# Patient Record
Sex: FEMALE | URBAN - METROPOLITAN AREA
[De-identification: names, ages, dates, MRNs, and addresses within clinical notes are randomized per-mention and may not be internally consistent; named-entity substitution may affect disease eponyms.]

---

## 2024-07-03 ENCOUNTER — DOCUMENTATION ONLY (OUTPATIENT)
Dept: SURGERY | Facility: CLINIC | Age: 59
End: 2024-07-03
Payer: COMMERCIAL

## 2024-07-03 NOTE — PROGRESS NOTES
Received external referral for patient to see Dr. Plata.  Explained Dr. Plata is not accepting new referrals at this time.  Scheduled patient for 7/16 with Dr. Anderson.  Patient verbalized understanding of appointment date, time and location.

## 2024-07-16 ENCOUNTER — OFFICE VISIT (OUTPATIENT)
Dept: HEMATOLOGY/ONCOLOGY | Facility: CLINIC | Age: 59
End: 2024-07-16
Payer: COMMERCIAL

## 2024-07-16 VITALS
TEMPERATURE: 99 F | WEIGHT: 202.63 LBS | HEART RATE: 81 BPM | OXYGEN SATURATION: 97 % | BODY MASS INDEX: 32.56 KG/M2 | DIASTOLIC BLOOD PRESSURE: 72 MMHG | SYSTOLIC BLOOD PRESSURE: 132 MMHG | HEIGHT: 66 IN

## 2024-07-16 DIAGNOSIS — C78.02 SECONDARY MALIGNANT NEOPLASM OF HILUS OF LEFT LUNG: ICD-10-CM

## 2024-07-16 DIAGNOSIS — C50.412 MALIGNANT NEOPLASM OF UPPER-OUTER QUADRANT OF LEFT BREAST IN FEMALE, ESTROGEN RECEPTOR NEGATIVE: Primary | ICD-10-CM

## 2024-07-16 DIAGNOSIS — C77.1 SECONDARY MALIGNANT NEOPLASM OF MEDIASTINAL LYMPH NODE: ICD-10-CM

## 2024-07-16 DIAGNOSIS — Z17.1 MALIGNANT NEOPLASM OF UPPER-OUTER QUADRANT OF LEFT BREAST IN FEMALE, ESTROGEN RECEPTOR NEGATIVE: Primary | ICD-10-CM

## 2024-07-16 PROCEDURE — G2211 COMPLEX E/M VISIT ADD ON: HCPCS | Mod: S$GLB,,, | Performed by: INTERNAL MEDICINE

## 2024-07-16 PROCEDURE — 99999 PR PBB SHADOW E&M-EST. PATIENT-LVL III: CPT | Mod: PBBFAC,,, | Performed by: INTERNAL MEDICINE

## 2024-07-16 PROCEDURE — 3008F BODY MASS INDEX DOCD: CPT | Mod: CPTII,S$GLB,, | Performed by: INTERNAL MEDICINE

## 2024-07-16 PROCEDURE — 3078F DIAST BP <80 MM HG: CPT | Mod: CPTII,S$GLB,, | Performed by: INTERNAL MEDICINE

## 2024-07-16 PROCEDURE — 1159F MED LIST DOCD IN RCRD: CPT | Mod: CPTII,S$GLB,, | Performed by: INTERNAL MEDICINE

## 2024-07-16 PROCEDURE — 3075F SYST BP GE 130 - 139MM HG: CPT | Mod: CPTII,S$GLB,, | Performed by: INTERNAL MEDICINE

## 2024-07-16 PROCEDURE — 99205 OFFICE O/P NEW HI 60 MIN: CPT | Mod: S$GLB,,, | Performed by: INTERNAL MEDICINE

## 2024-07-16 RX ORDER — OLANZAPINE 5 MG/1
10 TABLET ORAL DAILY
COMMUNITY

## 2024-07-16 RX ORDER — EZETIMIBE 10 MG/1
10 TABLET ORAL DAILY
COMMUNITY
Start: 2024-05-28

## 2024-07-16 RX ORDER — VENLAFAXINE HYDROCHLORIDE 75 MG/1
225 CAPSULE, EXTENDED RELEASE ORAL DAILY
COMMUNITY
Start: 2024-07-01

## 2024-07-16 RX ORDER — LEVOTHYROXINE SODIUM 112 UG/1
1 TABLET ORAL DAILY
COMMUNITY

## 2024-07-16 RX ORDER — HYDROCHLOROTHIAZIDE 25 MG/1
25 TABLET ORAL DAILY
COMMUNITY

## 2024-07-16 RX ORDER — MELOXICAM 15 MG/1
15 TABLET ORAL DAILY
COMMUNITY
Start: 2024-05-28

## 2024-07-16 RX ORDER — MESALAMINE 1.2 G/1
1.2 TABLET, DELAYED RELEASE ORAL DAILY
COMMUNITY

## 2024-07-16 NOTE — PROGRESS NOTES
"  [facility]/ [facility]   at [facility] Note:      Chief Complaint:   Encounter Diagnoses   Name Primary?    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 female who presents today for evaluation of newly diagnosed stage IV TNBC    Oncology History   Patient with TNBC diagnosed in 2022 in [place], [place]   Routine screening ultrasound demonstrated 11mm mass in the L breast   Biopsy 9/30/22: IDC, grade 3; ER-/NM-/HER2 0; PDL1 CPS <10    NACT with weekly carboplatin/paclitaxel x 12  Bilateral mastectomy March 2022: residual IDC, 1.7mm; 0 LN+  Adjuvant AC x 4 completed 5/23/23    Routine PET imaging without new disease  Follow up June 2024 demonstrated "elevated tumor markers" with high  and LDH  PET 6/11/24: bilateral hilar and mediastinal LAD, 2.7cm KRIS mass  Bronchoscopy 6/18/24: metastatic carcinoma 4R/11L c/w breast carcinoma     Started Keytruda/Abraxane 6/20/24       No family history on file.  No past medical history on file.  No past surgical history on file.    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    HENT: Negative.     Respiratory: Negative.     Cardiovascular: Negative.    Gastrointestinal: Negative.    Musculoskeletal: Negative.    Neurological: Negative.    All other systems reviewed and are negative.      /72 (BP Location: Right arm, Patient Position: Sitting, BP Method: Medium (Automatic))   Pulse 81   Temp 98.6 °F (37 °C)   Ht 5' 6" (1.676 m)   Wt 91.9 kg (202 lb 9.6 oz)   SpO2 97%   BMI 32.70 kg/m²     General Appearance:    Alert, cooperative, no distress, appears stated age   Head:    Normocephalic, without obvious abnormality, atraumatic   Eyes:    PERRL, conjunctiva/corneas clear   Nose:   Nares normal, septum midline   Throat:   Lips, mucosa, and tongue normal; teeth and gums normal   Lungs:     Respirations unlabored   Extremities:   Extremities normal, atraumatic, no cyanosis or edema   Pulses:   2+ and symmetric all extremities   Skin:   Skin color, texture, turgor normal, no rashes or lesions   Neurologic:   CNII-XII intact, normal gait         Pertinent Labs & Imaging:  Pathology Results  (Last 30 days)      None            No results found for this or any previous visit (from the past 24 hour(s)).    Assessment & Plan:    1. Malignant neoplasm of upper-outer quadrant of left breast in female, estrogen receptor negative  - NM PET CT FDG Skull Base to Mid Thigh; Future  - CBC W/ AUTO DIFFERENTIAL; Standing  - CMP; Standing    2. 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 Secondary malignant neoplasm of mediastinal lymph node  - NM PET CT FDG Skull Base to Mid Thigh; " Future      Reviewed patients referring notes, imaging and pathology. Discussed diagnosis, staging, and treatment in detail with patient.   Patient with newly diagnosed stage IV TNBC   Previously receiving Keytruda/Abraxane  Review of records show PDL1 CPS <10. No indication for Keytruda based on Keynote 355  Recommend weekly abraxane 3 on/ 1 off x 2 additional cycles followed by PET imaging     Weekly labs   Reviewed risks/benefits  Patient agrees     Route Chart for Scheduling    Med Onc Chart Routing      Follow up with physician 4 weeks.   Follow up with LINDA 2 weeks.   Infusion scheduling note New or changed treatment   weekly abraxane at [facility]   Injection scheduling note    Labs CBC and CMP   Scheduling:  Preferred lab:  Lab interval:  labs weekly prior to treatment   Imaging PET scan   week of Sept 9   Pharmacy appointment    Other referrals                  Treatment Plan Information   OP BREAST NAB-PACLITAXEL WEEKLY   Renetta Anderson MD   Upcoming Treatment Dates - OP BREAST NAB-PACLITAXEL WEEKLY    7/19/2024       Chemotherapy       PACLitaxeL protein-bound (ABRAXANE) 125 mg/m2 = 260 mg in 52 mL infusion  7/26/2024       Chemotherapy       PACLitaxeL protein-bound (ABRAXANE) 125 mg/m2 = 260 mg in 52 mL infusion  8/2/2024       Chemotherapy       PACLitaxeL protein-bound (ABRAXANE) 125 mg/m2 = 260 mg in 52 mL infusion  8/16/2024       Chemotherapy       PACLitaxeL protein-bound (ABRAXANE) 125 mg/m2 = 260 mg in 52 mL infusion    MDM includes  :    - Acute or chronic illness or injury that poses a threat to life or bodily function  - Review of prior external notes from unique source  - Independent review and explanation of 3+ results from unique tests  - Discussion of management and ordering 3+ unique tests  - Extensive discussion of treatment and management  - Prescription drug management  - Drug therapy requiring intensive monitoring for toxicity        Renetta Anderson MD   07/16/2024

## 2024-07-18 ENCOUNTER — TELEPHONE (OUTPATIENT)
Dept: HEMATOLOGY/ONCOLOGY | Facility: CLINIC | Age: 59
End: 2024-07-18
Payer: COMMERCIAL

## 2024-07-18 NOTE — TELEPHONE ENCOUNTER
"Outgoing call to patient regarding message below. Informed patient auth for chemo is pending and no appts are scheduled. Informed patient Message sent to pre service, scottie curtis, and dr mccarthy. Will follow up with patient.       ----- Message from Jett Vaughn sent at 7/18/2024 10:41 AM CDT -----  Consult/Advisory:    Name Of Caller: Self    Contact Preference?: [phone]     Provider Name: Monica    What is the nature of the call?: Requesting clarification about starting chemo on 7/19 (tomorrow)    Additional Notes:  "Thank you for all that you do for our patients"  "

## 2024-07-22 ENCOUNTER — TELEPHONE (OUTPATIENT)
Dept: HEMATOLOGY/ONCOLOGY | Facility: CLINIC | Age: 59
End: 2024-07-22
Payer: COMMERCIAL

## 2024-07-22 NOTE — TELEPHONE ENCOUNTER
"Informed patient dr mccarthy would not recommend paxlovid if she only has congestion. Patient verbalized understanding. Informed patient request for auth sent to pre service.       ----- Message from Renetta Mccarthy MD sent at 7/22/2024 10:19 AM CDT -----  I would not recommend Paxlovid if she only has congestion.  ----- Message -----  From: Norma Ramos RN  Sent: 7/22/2024   8:39 AM CDT  To: Renetta Mccarthy MD    Tested pos on friday, she only has congestion left she did cough on the phone , no fever. Symptoms started Thursday night.  ----- Message -----  From: Renetta Mccarthy MD  Sent: 7/22/2024   8:27 AM CDT  To: Norma Ramos RN    Is she symptomatic? If so, fevers? When did her symptoms start?   I would not advise paxlovid while on chemotherapy  ----- Message -----  From: Norma Ramos RN  Sent: 7/22/2024   7:35 AM CDT  To: eRnetta Mccarthy MD    She tested positive for covid, is it okay for her to take paxlovid while undergoing chemo ?  ----- Message -----  From: Jett Vauhgn  Sent: 7/19/2024   4:53 PM CDT  To: Monica Jackson Staff    Consult/Advisory    Name Of Caller: Self    Contact Preference?: [phone]     Provider Name: Monica    Does patient feel the need to be seen today? No    What is the nature of the call?: Stating she just tested positive for Covid. Inquiring if it's okay for her to take Paxlovid while undergoing chemo    Additional Notes:  "Thank you for all that you do for our patients"  "

## 2024-07-22 NOTE — TELEPHONE ENCOUNTER
"Spoke to patient...Tested pos on friday, she only has congestion left she did cough on the phone , no fever. Symptoms started Thursday night. Informed patient message sent to dr mccarthy.       ----- Message from Renetta Mccarthy MD sent at 7/22/2024  8:27 AM CDT -----  Is she symptomatic? If so, fevers? When did her symptoms start?   I would not advise paxlovid while on chemotherapy  ----- Message -----  From: Norma Ramos RN  Sent: 7/22/2024   7:35 AM CDT  To: Renetta Mccarthy MD    She tested positive for covid, is it okay for her to take paxlovid while undergoing chemo ?  ----- Message -----  From: Jett Vaughn  Sent: 7/19/2024   4:53 PM CDT  To: Monica Jackson Staff    Consult/Advisory    Name Of Caller: Self    Contact Preference?: [phone]     Provider Name: Monica    Does patient feel the need to be seen today? No    What is the nature of the call?: Stating she just tested positive for Covid. Inquiring if it's okay for her to take Paxlovid while undergoing chemo    Additional Notes:  "Thank you for all that you do for our patients"  "

## 2024-07-23 ENCOUNTER — TELEPHONE (OUTPATIENT)
Dept: HEMATOLOGY/ONCOLOGY | Facility: CLINIC | Age: 59
End: 2024-07-23
Payer: COMMERCIAL

## 2024-07-25 ENCOUNTER — TELEPHONE (OUTPATIENT)
Dept: HEMATOLOGY/ONCOLOGY | Facility: CLINIC | Age: 59
End: 2024-07-25
Payer: COMMERCIAL

## 2024-07-25 NOTE — TELEPHONE ENCOUNTER
----- Message from Jerrica Stroud sent at 7/25/2024 10:38 AM CDT -----  Regarding: Patient advice  Contact: Pt  [phone]            Name of Caller: Jacki Hayward Preference: [phone]     Nature of Call:  Requesting a call to confirm treatment regimen states she thought PET scan  would be after the second cycle  pt put in appt request for 07/29/24

## 2024-07-25 NOTE — TELEPHONE ENCOUNTER
----- Message from Asya Trevino sent at 7/25/2024  9:58 AM CDT -----  Good morning,    Patient called to schedule chemo on the [facility]. She is requesting a call back.    Thank you,  Asya RUDD  Oncology

## 2024-08-01 ENCOUNTER — OFFICE VISIT (OUTPATIENT)
Dept: HEMATOLOGY/ONCOLOGY | Facility: CLINIC | Age: 59
End: 2024-08-01
Payer: COMMERCIAL

## 2024-08-01 ENCOUNTER — LAB VISIT (OUTPATIENT)
Dept: LAB | Facility: HOSPITAL | Age: 59
End: 2024-08-01
Attending: INTERNAL MEDICINE
Payer: COMMERCIAL

## 2024-08-01 VITALS
HEART RATE: 74 BPM | DIASTOLIC BLOOD PRESSURE: 77 MMHG | BODY MASS INDEX: 32.56 KG/M2 | TEMPERATURE: 98 F | HEIGHT: 66 IN | OXYGEN SATURATION: 98 % | SYSTOLIC BLOOD PRESSURE: 140 MMHG | WEIGHT: 202.63 LBS

## 2024-08-01 DIAGNOSIS — C78.02 SECONDARY MALIGNANT NEOPLASM OF HILUS OF LEFT LUNG: ICD-10-CM

## 2024-08-01 DIAGNOSIS — C50.412 MALIGNANT NEOPLASM OF UPPER-OUTER QUADRANT OF LEFT BREAST IN FEMALE, ESTROGEN RECEPTOR NEGATIVE: ICD-10-CM

## 2024-08-01 DIAGNOSIS — Z17.1 MALIGNANT NEOPLASM OF UPPER-OUTER QUADRANT OF LEFT BREAST IN FEMALE, ESTROGEN RECEPTOR NEGATIVE: ICD-10-CM

## 2024-08-01 DIAGNOSIS — C77.1 SECONDARY MALIGNANT NEOPLASM OF MEDIASTINAL LYMPH NODE: ICD-10-CM

## 2024-08-01 DIAGNOSIS — C50.412 MALIGNANT NEOPLASM OF UPPER-OUTER QUADRANT OF LEFT BREAST IN FEMALE, ESTROGEN RECEPTOR NEGATIVE: Primary | ICD-10-CM

## 2024-08-01 DIAGNOSIS — Z17.1 MALIGNANT NEOPLASM OF UPPER-OUTER QUADRANT OF LEFT BREAST IN FEMALE, ESTROGEN RECEPTOR NEGATIVE: Primary | ICD-10-CM

## 2024-08-01 LAB
ALBUMIN SERPL BCP-MCNC: 3.8 G/DL (ref 3.5–5.2)
ALP SERPL-CCNC: 104 U/L (ref 55–135)
ALT SERPL W/O P-5'-P-CCNC: 28 U/L (ref 10–44)
ANION GAP SERPL CALC-SCNC: 8 MMOL/L (ref 8–16)
AST SERPL-CCNC: 24 U/L (ref 10–40)
BASOPHILS # BLD AUTO: 0.03 K/UL (ref 0–0.2)
BASOPHILS NFR BLD: 0.5 % (ref 0–1.9)
BILIRUB SERPL-MCNC: 0.4 MG/DL (ref 0.1–1)
BUN SERPL-MCNC: 11 MG/DL (ref 6–20)
CALCIUM SERPL-MCNC: 9.7 MG/DL (ref 8.7–10.5)
CHLORIDE SERPL-SCNC: 98 MMOL/L (ref 95–110)
CO2 SERPL-SCNC: 32 MMOL/L (ref 23–29)
CREAT SERPL-MCNC: 0.8 MG/DL (ref 0.5–1.4)
DIFFERENTIAL METHOD BLD: ABNORMAL
EOSINOPHIL # BLD AUTO: 0 K/UL (ref 0–0.5)
EOSINOPHIL NFR BLD: 0 % (ref 0–8)
ERYTHROCYTE [DISTWIDTH] IN BLOOD BY AUTOMATED COUNT: 13.2 % (ref 11.5–14.5)
EST. GFR  (NO RACE VARIABLE): >60 ML/MIN/1.73 M^2
GLUCOSE SERPL-MCNC: 92 MG/DL (ref 70–110)
HCT VFR BLD AUTO: 36.6 % (ref 37–48.5)
HGB BLD-MCNC: 12.7 G/DL (ref 12–16)
IMM GRANULOCYTES # BLD AUTO: 0.03 K/UL (ref 0–0.04)
IMM GRANULOCYTES NFR BLD AUTO: 0.5 % (ref 0–0.5)
LYMPHOCYTES # BLD AUTO: 1.3 K/UL (ref 1–4.8)
LYMPHOCYTES NFR BLD: 21.8 % (ref 18–48)
MCH RBC QN AUTO: 33.5 PG (ref 27–31)
MCHC RBC AUTO-ENTMCNC: 34.7 G/DL (ref 32–36)
MCV RBC AUTO: 97 FL (ref 82–98)
MONOCYTES # BLD AUTO: 0.8 K/UL (ref 0.3–1)
MONOCYTES NFR BLD: 13.8 % (ref 4–15)
NEUTROPHILS # BLD AUTO: 3.8 K/UL (ref 1.8–7.7)
NEUTROPHILS NFR BLD: 63.4 % (ref 38–73)
NRBC BLD-RTO: 0 /100 WBC
PLATELET # BLD AUTO: 278 K/UL (ref 150–450)
PMV BLD AUTO: 8.1 FL (ref 9.2–12.9)
POTASSIUM SERPL-SCNC: 3.6 MMOL/L (ref 3.5–5.1)
PROT SERPL-MCNC: 7.4 G/DL (ref 6–8.4)
RBC # BLD AUTO: 3.79 M/UL (ref 4–5.4)
SODIUM SERPL-SCNC: 138 MMOL/L (ref 136–145)
WBC # BLD AUTO: 6 K/UL (ref 3.9–12.7)

## 2024-08-01 PROCEDURE — 3077F SYST BP >= 140 MM HG: CPT | Mod: CPTII,S$GLB,, | Performed by: NURSE PRACTITIONER

## 2024-08-01 PROCEDURE — 85025 COMPLETE CBC W/AUTO DIFF WBC: CPT | Performed by: INTERNAL MEDICINE

## 2024-08-01 PROCEDURE — 99215 OFFICE O/P EST HI 40 MIN: CPT | Mod: S$GLB,,, | Performed by: NURSE PRACTITIONER

## 2024-08-01 PROCEDURE — 99999 PR PBB SHADOW E&M-EST. PATIENT-LVL III: CPT | Mod: PBBFAC,,, | Performed by: NURSE PRACTITIONER

## 2024-08-01 PROCEDURE — G2211 COMPLEX E/M VISIT ADD ON: HCPCS | Mod: S$GLB,,, | Performed by: NURSE PRACTITIONER

## 2024-08-01 PROCEDURE — 3008F BODY MASS INDEX DOCD: CPT | Mod: CPTII,S$GLB,, | Performed by: NURSE PRACTITIONER

## 2024-08-01 PROCEDURE — 80053 COMPREHEN METABOLIC PANEL: CPT | Performed by: INTERNAL MEDICINE

## 2024-08-01 PROCEDURE — 36415 COLL VENOUS BLD VENIPUNCTURE: CPT | Performed by: INTERNAL MEDICINE

## 2024-08-01 PROCEDURE — 3078F DIAST BP <80 MM HG: CPT | Mod: CPTII,S$GLB,, | Performed by: NURSE PRACTITIONER

## 2024-08-01 RX ORDER — EPINEPHRINE 0.3 MG/.3ML
0.3 INJECTION SUBCUTANEOUS ONCE AS NEEDED
OUTPATIENT
Start: 2024-08-05

## 2024-08-01 RX ORDER — HEPARIN 100 UNIT/ML
500 SYRINGE INTRAVENOUS
OUTPATIENT
Start: 2024-08-05

## 2024-08-01 RX ORDER — HEPARIN 100 UNIT/ML
500 SYRINGE INTRAVENOUS
OUTPATIENT
Start: 2024-08-12

## 2024-08-01 RX ORDER — SODIUM CHLORIDE 0.9 % (FLUSH) 0.9 %
10 SYRINGE (ML) INJECTION
OUTPATIENT
Start: 2024-08-12

## 2024-08-01 RX ORDER — DIPHENHYDRAMINE HYDROCHLORIDE 50 MG/ML
50 INJECTION INTRAMUSCULAR; INTRAVENOUS ONCE AS NEEDED
OUTPATIENT
Start: 2024-08-12

## 2024-08-01 RX ORDER — PROCHLORPERAZINE EDISYLATE 5 MG/ML
10 INJECTION INTRAMUSCULAR; INTRAVENOUS ONCE AS NEEDED
Start: 2024-08-12

## 2024-08-01 RX ORDER — PROCHLORPERAZINE EDISYLATE 5 MG/ML
10 INJECTION INTRAMUSCULAR; INTRAVENOUS ONCE AS NEEDED
Start: 2024-08-05

## 2024-08-01 RX ORDER — SODIUM CHLORIDE 0.9 % (FLUSH) 0.9 %
10 SYRINGE (ML) INJECTION
OUTPATIENT
Start: 2024-08-19

## 2024-08-01 RX ORDER — SODIUM CHLORIDE 0.9 % (FLUSH) 0.9 %
10 SYRINGE (ML) INJECTION
OUTPATIENT
Start: 2024-08-05

## 2024-08-01 RX ORDER — EPINEPHRINE 0.3 MG/.3ML
0.3 INJECTION SUBCUTANEOUS ONCE AS NEEDED
OUTPATIENT
Start: 2024-08-19

## 2024-08-01 RX ORDER — DIPHENHYDRAMINE HYDROCHLORIDE 50 MG/ML
50 INJECTION INTRAMUSCULAR; INTRAVENOUS ONCE AS NEEDED
OUTPATIENT
Start: 2024-08-19

## 2024-08-01 RX ORDER — HEPARIN 100 UNIT/ML
500 SYRINGE INTRAVENOUS
OUTPATIENT
Start: 2024-08-19

## 2024-08-01 RX ORDER — PROCHLORPERAZINE EDISYLATE 5 MG/ML
10 INJECTION INTRAMUSCULAR; INTRAVENOUS ONCE AS NEEDED
Start: 2024-08-19

## 2024-08-01 RX ORDER — DIPHENHYDRAMINE HYDROCHLORIDE 50 MG/ML
50 INJECTION INTRAMUSCULAR; INTRAVENOUS ONCE AS NEEDED
OUTPATIENT
Start: 2024-08-05

## 2024-08-01 RX ORDER — EPINEPHRINE 0.3 MG/.3ML
0.3 INJECTION SUBCUTANEOUS ONCE AS NEEDED
OUTPATIENT
Start: 2024-08-12

## 2024-08-01 NOTE — PROGRESS NOTES
"  [facility]/ [facility]   at [facility] Note:      Chief Complaint:   Encounter Diagnoses   Name Primary?    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 female who presents today for evaluation of newly diagnosed stage IV TNBC.  She is here for follow up and to start C2D1 of abraxane.  (Cycle 1 was given at an outside facility in [place]).  She is feeling well today with no new complaints.     Per Dr. Anderson's note:   Oncology History   Patient with TNBC diagnosed in 2022 in [place], [place]   Routine screening ultrasound demonstrated 11mm mass in the L breast   Biopsy 9/30/22: IDC, grade 3; ER-/KS-/HER2 0; PDL1 CPS <10    NACT with weekly carboplatin/paclitaxel x 12  Bilateral mastectomy March 2022: residual IDC, 1.7mm; 0 LN+  Adjuvant AC x 4 completed 5/23/23    Routine PET imaging without new disease  Follow up June 2024 demonstrated "elevated tumor markers" with high  and LDH  PET 6/11/24: bilateral hilar and mediastinal LAD, 2.7cm KRIS mass  Bronchoscopy 6/18/24: metastatic carcinoma 4R/11L c/w breast carcinoma     Started Keytruda/Abraxane 6/20/24       No family history on file.  No past medical history on file.  No past surgical history on file.    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    HENT: Negative.     Respiratory: Negative.     Cardiovascular: Negative.    Gastrointestinal: Negative.    Musculoskeletal: Negative.    Neurological: Negative.    All other systems reviewed and are negative.      BP (!) 140/77 (BP Location: Right arm, Patient Position: Sitting, BP Method: Medium (Automatic))   Pulse 74   Temp 98 °F (36.7 °C) (Oral)   Ht 5' 6" (1.676 m)   Wt 91.9 kg (202 lb 9.6 oz)   SpO2 98%   BMI 32.70 kg/m²     General Appearance:    Alert, cooperative, no distress, appears stated age   Head:    Normocephalic, without obvious abnormality, atraumatic   Eyes:    PERRL, conjunctiva/corneas clear   Nose:   Nares normal, septum midline   Throat:   Lips, mucosa, and tongue normal; teeth and gums normal   Lungs:     Respirations unlabored   Extremities:   Extremities normal, atraumatic, no cyanosis or edema   Pulses:   2+ and symmetric all extremities   Skin:   Skin color, texture, turgor normal, no rashes or lesions   Neurologic:   CNII-XII intact, normal gait     Pertinent Labs & Imaging:  Pathology Results  (Last 30 days)      None            Recent Results (from the past 24 hour(s))   CBC W/ AUTO DIFFERENTIAL    Collection Time: 08/01/24  7:29 AM   Result Value Ref Range    WBC 6.00 3.90 - 12.70 K/uL    RBC 3.79 (L) 4.00 - 5.40 M/uL    Hemoglobin 12.7 12.0 - 16.0 g/dL    Hematocrit 36.6 (L) 37.0 - 48.5 %    MCV 97 82 - 98 fL    MCH 33.5 (H) 27.0 - 31.0 pg    MCHC 34.7 32.0 - 36.0 g/dL    RDW 13.2 " 11.5 - 14.5 %    Platelets 278 150 - 450 K/uL    MPV 8.1 (L) 9.2 - 12.9 fL    Immature Granulocytes 0.5 0.0 - 0.5 %    Gran # (ANC) 3.8 1.8 - 7.7 K/uL    Immature Grans (Abs) 0.03 0.00 - 0.04 K/uL    Lymph # 1.3 1.0 - 4.8 K/uL    Mono # 0.8 0.3 - 1.0 K/uL    Eos # 0.0 0.0 - 0.5 K/uL    Baso # 0.03 0.00 - 0.20 K/uL    nRBC 0 0 /100 WBC    Gran % 63.4 38.0 - 73.0 %    Lymph % 21.8 18.0 - 48.0 %    Mono % 13.8 4.0 - 15.0 %    Eosinophil % 0.0 0.0 - 8.0 %    Basophil % 0.5 0.0 - 1.9 %    Differential Method Automated    CMP    Collection Time: 08/01/24  7:29 AM   Result Value Ref Range    Sodium 138 136 - 145 mmol/L    Potassium 3.6 3.5 - 5.1 mmol/L    Chloride 98 95 - 110 mmol/L    CO2 32 (H) 23 - 29 mmol/L    Glucose 92 70 - 110 mg/dL    BUN 11 6 - 20 mg/dL    Creatinine 0.8 0.5 - 1.4 mg/dL    Calcium 9.7 8.7 - 10.5 mg/dL    Total Protein 7.4 6.0 - 8.4 g/dL    Albumin 3.8 3.5 - 5.2 g/dL    Total Bilirubin 0.4 0.1 - 1.0 mg/dL    Alkaline Phosphatase 104 55 - 135 U/L    AST 24 10 - 40 U/L    ALT 28 10 - 44 U/L    eGFR >60.0 >60 mL/min/1.73 m^2    Anion Gap 8 8 - 16 mmol/L       Assessment & Plan:    1. Malignant neoplasm of upper-outer quadrant of left breast in female, estrogen receptor negative    2. 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 Secondary malignant neoplasm of mediastinal lymph node    Reviewed patients referring notes, imaging and pathology. Discussed diagnosis, staging, and treatment in detail with patient.   Patient with newly diagnosed stage IV TNBC   Previously receiving Keytruda/Abraxane  Review of records show PDL1 CPS <10. No indication for Keytruda based on Keynote 355  Recommend weekly abraxane 3 on/ 1 off x 2 additional cycles followed by PET imaging   Side effect of abraxane reviewed and consents signed.  Ccc order offered, pt declined today  Discussed use of cryotherapy gloves and discussed acupuncture.  Pt not interested in IO referral at this time  Follow up in 2 weeks with  Dr. Anderson      Route Chart for Scheduling    Med Onc Chart Routing  Urgent    Follow up with physician 2 weeks. (8/16 if possible)   Follow up with LINDA 4 weeks.   Infusion scheduling note   abraxane 3 weeks on, one week off, mondays on [facility] (please call pt to schedule out, she said she has not been getting calls about appointments)   Injection scheduling note    Labs CBC and CMP   Scheduling:  Preferred lab:  Lab interval:  3 weeks on and one week off, would like labs day of infusion at [facility]   Imaging    Pharmacy appointment    Other referrals                Treatment Plan Information   OP BREAST NAB-PACLITAXEL WEEKLY   Renetta Anderson MD   Upcoming Treatment Dates - OP BREAST NAB-PACLITAXEL WEEKLY    8/5/2024       Chemotherapy       PACLitaxeL protein-bound (ABRAXANE) 125 mg/m2 = 260 mg in 52 mL infusion  8/12/2024       Chemotherapy       PACLitaxeL protein-bound (ABRAXANE) 125 mg/m2 = 260 mg in 52 mL infusion  8/19/2024       Chemotherapy       PACLitaxeL protein-bound (ABRAXANE) 125 mg/m2 = 260 mg in 52 mL infusion  9/2/2024       Chemotherapy       PACLitaxeL protein-bound (ABRAXANE) 125 mg/m2 = 260 mg in 52 mL infusion    MDM includes  :    - Acute or chronic illness or injury that poses a threat to life or bodily function  - Review of prior external notes from unique source  - Independent review and explanation of 3+ results from unique tests  - Discussion of management and ordering 3+ unique tests  - Extensive discussion of treatment and management  - Prescription drug management  - Drug therapy requiring intensive monitoring for toxicity      Jacquelyn Apodaca, SHON   08/01/2024

## 2024-08-01 NOTE — Clinical Note
Since this is her first cycle here, can you please sign her orders for Monday.  Thanks.  Yes, Non-Core measure site...

## 2024-08-02 ENCOUNTER — TELEPHONE (OUTPATIENT)
Dept: HEMATOLOGY/ONCOLOGY | Facility: CLINIC | Age: 59
End: 2024-08-02
Payer: COMMERCIAL

## 2024-08-05 ENCOUNTER — INFUSION (OUTPATIENT)
Dept: INFUSION THERAPY | Facility: HOSPITAL | Age: 59
End: 2024-08-05
Attending: INTERNAL MEDICINE
Payer: COMMERCIAL

## 2024-08-05 VITALS
WEIGHT: 202.63 LBS | RESPIRATION RATE: 16 BRPM | DIASTOLIC BLOOD PRESSURE: 69 MMHG | SYSTOLIC BLOOD PRESSURE: 131 MMHG | HEIGHT: 66 IN | TEMPERATURE: 98 F | HEART RATE: 78 BPM | OXYGEN SATURATION: 97 % | BODY MASS INDEX: 32.56 KG/M2

## 2024-08-05 DIAGNOSIS — C50.412 MALIGNANT NEOPLASM OF UPPER-OUTER QUADRANT OF LEFT BREAST IN FEMALE, ESTROGEN RECEPTOR NEGATIVE: Primary | ICD-10-CM

## 2024-08-05 DIAGNOSIS — C77.1 SECONDARY MALIGNANT NEOPLASM OF MEDIASTINAL LYMPH NODE: ICD-10-CM

## 2024-08-05 DIAGNOSIS — Z17.1 MALIGNANT NEOPLASM OF UPPER-OUTER QUADRANT OF LEFT BREAST IN FEMALE, ESTROGEN RECEPTOR NEGATIVE: Primary | ICD-10-CM

## 2024-08-05 DIAGNOSIS — C78.02 SECONDARY MALIGNANT NEOPLASM OF HILUS OF LEFT LUNG: ICD-10-CM

## 2024-08-05 PROCEDURE — 63600175 PHARM REV CODE 636 W HCPCS: Performed by: INTERNAL MEDICINE

## 2024-08-05 PROCEDURE — 25000003 PHARM REV CODE 250: Performed by: INTERNAL MEDICINE

## 2024-08-05 PROCEDURE — 96413 CHEMO IV INFUSION 1 HR: CPT

## 2024-08-05 PROCEDURE — A4216 STERILE WATER/SALINE, 10 ML: HCPCS | Performed by: INTERNAL MEDICINE

## 2024-08-05 PROCEDURE — 96409 CHEMO IV PUSH SNGL DRUG: CPT

## 2024-08-05 RX ORDER — EPINEPHRINE 0.3 MG/.3ML
0.3 INJECTION SUBCUTANEOUS ONCE AS NEEDED
Status: DISCONTINUED | OUTPATIENT
Start: 2024-08-05 | End: 2024-08-05 | Stop reason: HOSPADM

## 2024-08-05 RX ORDER — HEPARIN 100 UNIT/ML
500 SYRINGE INTRAVENOUS
Status: DISCONTINUED | OUTPATIENT
Start: 2024-08-05 | End: 2024-08-05 | Stop reason: HOSPADM

## 2024-08-05 RX ORDER — SODIUM CHLORIDE 0.9 % (FLUSH) 0.9 %
10 SYRINGE (ML) INJECTION
Status: DISCONTINUED | OUTPATIENT
Start: 2024-08-05 | End: 2024-08-05 | Stop reason: HOSPADM

## 2024-08-05 RX ORDER — PROCHLORPERAZINE EDISYLATE 5 MG/ML
10 INJECTION INTRAMUSCULAR; INTRAVENOUS ONCE AS NEEDED
Status: DISCONTINUED | OUTPATIENT
Start: 2024-08-05 | End: 2024-08-05 | Stop reason: HOSPADM

## 2024-08-05 RX ORDER — DIPHENHYDRAMINE HYDROCHLORIDE 50 MG/ML
50 INJECTION INTRAMUSCULAR; INTRAVENOUS ONCE AS NEEDED
Status: DISCONTINUED | OUTPATIENT
Start: 2024-08-05 | End: 2024-08-05 | Stop reason: HOSPADM

## 2024-08-05 RX ADMIN — SODIUM CHLORIDE: 9 INJECTION, SOLUTION INTRAVENOUS at 10:08

## 2024-08-05 RX ADMIN — HEPARIN 500 UNITS: 100 SYRINGE at 11:08

## 2024-08-05 RX ADMIN — PACLITAXEL 200 MG: 100 INJECTION, POWDER, LYOPHILIZED, FOR SUSPENSION INTRAVENOUS at 10:08

## 2024-08-05 RX ADMIN — Medication 10 ML: at 11:08

## 2024-08-12 ENCOUNTER — INFUSION (OUTPATIENT)
Dept: INFUSION THERAPY | Facility: HOSPITAL | Age: 59
End: 2024-08-12
Payer: COMMERCIAL

## 2024-08-12 ENCOUNTER — LAB VISIT (OUTPATIENT)
Dept: LAB | Facility: HOSPITAL | Age: 59
End: 2024-08-12
Attending: INTERNAL MEDICINE
Payer: COMMERCIAL

## 2024-08-12 VITALS
RESPIRATION RATE: 16 BRPM | BODY MASS INDEX: 33.31 KG/M2 | WEIGHT: 206.38 LBS | HEART RATE: 79 BPM | OXYGEN SATURATION: 99 % | SYSTOLIC BLOOD PRESSURE: 139 MMHG | TEMPERATURE: 98 F | DIASTOLIC BLOOD PRESSURE: 74 MMHG

## 2024-08-12 DIAGNOSIS — Z17.1 MALIGNANT NEOPLASM OF UPPER-OUTER QUADRANT OF LEFT BREAST IN FEMALE, ESTROGEN RECEPTOR NEGATIVE: ICD-10-CM

## 2024-08-12 DIAGNOSIS — C78.02 SECONDARY MALIGNANT NEOPLASM OF HILUS OF LEFT LUNG: ICD-10-CM

## 2024-08-12 DIAGNOSIS — Z17.1 MALIGNANT NEOPLASM OF UPPER-OUTER QUADRANT OF LEFT BREAST IN FEMALE, ESTROGEN RECEPTOR NEGATIVE: Primary | ICD-10-CM

## 2024-08-12 DIAGNOSIS — C50.412 MALIGNANT NEOPLASM OF UPPER-OUTER QUADRANT OF LEFT BREAST IN FEMALE, ESTROGEN RECEPTOR NEGATIVE: ICD-10-CM

## 2024-08-12 DIAGNOSIS — C50.412 MALIGNANT NEOPLASM OF UPPER-OUTER QUADRANT OF LEFT BREAST IN FEMALE, ESTROGEN RECEPTOR NEGATIVE: Primary | ICD-10-CM

## 2024-08-12 DIAGNOSIS — C77.1 SECONDARY MALIGNANT NEOPLASM OF MEDIASTINAL LYMPH NODE: ICD-10-CM

## 2024-08-12 LAB
ALBUMIN SERPL BCP-MCNC: 3.6 G/DL (ref 3.5–5.2)
ALP SERPL-CCNC: 105 U/L (ref 55–135)
ALT SERPL W/O P-5'-P-CCNC: 24 U/L (ref 10–44)
ANION GAP SERPL CALC-SCNC: 9 MMOL/L (ref 8–16)
AST SERPL-CCNC: 24 U/L (ref 10–40)
BASOPHILS # BLD AUTO: 0.01 K/UL (ref 0–0.2)
BASOPHILS NFR BLD: 0.3 % (ref 0–1.9)
BILIRUB SERPL-MCNC: 0.3 MG/DL (ref 0.1–1)
BUN SERPL-MCNC: 10 MG/DL (ref 6–20)
CALCIUM SERPL-MCNC: 9.4 MG/DL (ref 8.7–10.5)
CHLORIDE SERPL-SCNC: 99 MMOL/L (ref 95–110)
CO2 SERPL-SCNC: 30 MMOL/L (ref 23–29)
CREAT SERPL-MCNC: 0.8 MG/DL (ref 0.5–1.4)
DIFFERENTIAL METHOD BLD: ABNORMAL
EOSINOPHIL # BLD AUTO: 0 K/UL (ref 0–0.5)
EOSINOPHIL NFR BLD: 0 % (ref 0–8)
ERYTHROCYTE [DISTWIDTH] IN BLOOD BY AUTOMATED COUNT: 12.7 % (ref 11.5–14.5)
EST. GFR  (NO RACE VARIABLE): >60 ML/MIN/1.73 M^2
GLUCOSE SERPL-MCNC: 104 MG/DL (ref 70–110)
HCT VFR BLD AUTO: 35.8 % (ref 37–48.5)
HGB BLD-MCNC: 12.1 G/DL (ref 12–16)
IMM GRANULOCYTES # BLD AUTO: 0.03 K/UL (ref 0–0.04)
IMM GRANULOCYTES NFR BLD AUTO: 0.9 % (ref 0–0.5)
LYMPHOCYTES # BLD AUTO: 1.1 K/UL (ref 1–4.8)
LYMPHOCYTES NFR BLD: 31.3 % (ref 18–48)
MCH RBC QN AUTO: 33.6 PG (ref 27–31)
MCHC RBC AUTO-ENTMCNC: 33.8 G/DL (ref 32–36)
MCV RBC AUTO: 99 FL (ref 82–98)
MONOCYTES # BLD AUTO: 0.3 K/UL (ref 0.3–1)
MONOCYTES NFR BLD: 9.5 % (ref 4–15)
NEUTROPHILS # BLD AUTO: 2 K/UL (ref 1.8–7.7)
NEUTROPHILS NFR BLD: 58 % (ref 38–73)
NRBC BLD-RTO: 0 /100 WBC
PLATELET # BLD AUTO: 267 K/UL (ref 150–450)
PMV BLD AUTO: 8.1 FL (ref 9.2–12.9)
POTASSIUM SERPL-SCNC: 3.5 MMOL/L (ref 3.5–5.1)
PROT SERPL-MCNC: 7 G/DL (ref 6–8.4)
RBC # BLD AUTO: 3.6 M/UL (ref 4–5.4)
SODIUM SERPL-SCNC: 138 MMOL/L (ref 136–145)
WBC # BLD AUTO: 3.48 K/UL (ref 3.9–12.7)

## 2024-08-12 PROCEDURE — 63600175 PHARM REV CODE 636 W HCPCS: Performed by: INTERNAL MEDICINE

## 2024-08-12 PROCEDURE — 85025 COMPLETE CBC W/AUTO DIFF WBC: CPT | Performed by: INTERNAL MEDICINE

## 2024-08-12 PROCEDURE — 80053 COMPREHEN METABOLIC PANEL: CPT | Performed by: INTERNAL MEDICINE

## 2024-08-12 PROCEDURE — 36415 COLL VENOUS BLD VENIPUNCTURE: CPT | Performed by: INTERNAL MEDICINE

## 2024-08-12 PROCEDURE — 96413 CHEMO IV INFUSION 1 HR: CPT

## 2024-08-12 RX ORDER — HEPARIN 100 UNIT/ML
500 SYRINGE INTRAVENOUS
Status: DISCONTINUED | OUTPATIENT
Start: 2024-08-12 | End: 2024-08-12 | Stop reason: HOSPADM

## 2024-08-12 RX ORDER — PROCHLORPERAZINE EDISYLATE 5 MG/ML
10 INJECTION INTRAMUSCULAR; INTRAVENOUS ONCE AS NEEDED
Status: DISCONTINUED | OUTPATIENT
Start: 2024-08-12 | End: 2024-08-12 | Stop reason: HOSPADM

## 2024-08-12 RX ADMIN — HEPARIN 500 UNITS: 100 SYRINGE at 11:08

## 2024-08-12 RX ADMIN — PACLITAXEL 200 MG: 100 INJECTION, POWDER, LYOPHILIZED, FOR SUSPENSION INTRAVENOUS at 10:08

## 2024-08-12 NOTE — PLAN OF CARE
Pt tolerated chemotherapy Abraxane with no complaints or S&S of reaction and discharged home upon completion in [facility].

## 2024-08-14 ENCOUNTER — OFFICE VISIT (OUTPATIENT)
Dept: HEMATOLOGY/ONCOLOGY | Facility: CLINIC | Age: 59
End: 2024-08-14
Payer: COMMERCIAL

## 2024-08-14 DIAGNOSIS — C77.1 SECONDARY MALIGNANT NEOPLASM OF MEDIASTINAL LYMPH NODE: ICD-10-CM

## 2024-08-14 DIAGNOSIS — C78.02 SECONDARY MALIGNANT NEOPLASM OF HILUS OF LEFT LUNG: ICD-10-CM

## 2024-08-14 DIAGNOSIS — C50.412 MALIGNANT NEOPLASM OF UPPER-OUTER QUADRANT OF LEFT BREAST IN FEMALE, ESTROGEN RECEPTOR NEGATIVE: Primary | ICD-10-CM

## 2024-08-14 DIAGNOSIS — Z17.1 MALIGNANT NEOPLASM OF UPPER-OUTER QUADRANT OF LEFT BREAST IN FEMALE, ESTROGEN RECEPTOR NEGATIVE: Primary | ICD-10-CM

## 2024-08-14 PROCEDURE — G2211 COMPLEX E/M VISIT ADD ON: HCPCS | Mod: 95,,, | Performed by: INTERNAL MEDICINE

## 2024-08-14 PROCEDURE — 99215 OFFICE O/P EST HI 40 MIN: CPT | Mod: 95,,, | Performed by: INTERNAL MEDICINE

## 2024-08-14 NOTE — PROGRESS NOTES
"  [facility]/ [facility]   at [facility] Note:    The patient location is: home  The chief complaint leading to consultation is: breast cancer    Visit type: audiovisual      Each patient to whom he or she provides medical services by telemedicine is:  (1) informed of the relationship between the physician and patient and the respective role of any other health care provider with respect to management of the patient; and (2) notified that he or she may decline to receive medical services by telemedicine and may withdraw from such care at any time.    Notes:     Chief Complaint:   Encounter Diagnoses   Name Primary?    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 female who presents today for follow up for stage IV TNBC. She follows up with primary care  She had COVID recently but has made a full recovery  She is due for C2D15.       Oncology History   Patient with TNBC diagnosed in 2022 in [place], [place]   Routine screening ultrasound demonstrated 11mm mass in the L breast   Biopsy 9/30/22: IDC, grade 3; ER-/DC-/HER2 0; PDL1 CPS <10    NACT with weekly carboplatin/paclitaxel x 12  Bilateral mastectomy March 2022: residual IDC, 1.7mm; 0 LN+  Adjuvant AC x 4 completed 5/23/23    Routine PET imaging without new disease  Follow up June 2024 demonstrated "elevated tumor markers" with high  and LDH  PET 6/11/24: bilateral hilar and mediastinal LAD, 2.7cm KRIS " mass  Bronchoscopy 6/18/24: metastatic carcinoma 4R/11L c/w breast carcinoma     Started Keytruda/Abraxane 6/20/24       No family history on file.  No past medical history on file.  No past surgical history on file.    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    HENT: Negative.     Respiratory: Negative.     Cardiovascular: Negative.    Gastrointestinal: Negative.    Musculoskeletal: Negative.    Neurological: Negative.    All other systems reviewed and are negative.    Virtual visit     Pertinent Labs & Imaging:  Pathology Results  (Last 30 days)      None            No results found for this or any previous visit (from the past 24 hour(s)).      Assessment & Plan:    1. Malignant neoplasm of upper-outer quadrant of left breast in female, estrogen receptor negative    2. 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 Secondary malignant neoplasm of mediastinal lymph node      Patient with newly diagnosed stage IV TNBC   Previously receiving Keytruda/Abraxane but review of records show PDL1 CPS <10. No indication for Keytruda based on Keynote 355  Recommend weekly abraxane 3 on/ 1 off x 1 additional cycle followed by PET imaging   Discussed use of cryotherapy gloves and discussed acupuncture.  Pt not interested in IO referral at this time  Follow up  in 2 weeks with me  Ok for treatment on Monday  Will plan to schedule remaining treatments for cycle #3 and move up PET imaging       Route Chart for Scheduling    Med Onc Chart Routing  Urgent    Follow up with physician . 2 weeks, flip with paul   Follow up with LINDA    Infusion scheduling note   please schedule next cycle 9/3, 9/9, 9/16   Injection scheduling note    Labs CBC and CMP   Scheduling:  Preferred lab:  Lab interval: once a week     Imaging PET scan   move to week of August 26   Pharmacy appointment    Other referrals                Treatment Plan Information   OP BREAST NAB-PACLITAXEL WEEKLY   Renetta Anderson MD   Upcoming Treatment Dates - OP BREAST NAB-PACLITAXEL WEEKLY    8/19/2024       Chemotherapy       PACLitaxeL protein-bound (ABRAXANE) 100 mg/m2 = 210 mg in 42 mL infusion  9/2/2024       Chemotherapy       PACLitaxeL protein-bound (ABRAXANE) 100 mg/m2 = 210 mg in 42 mL infusion  9/9/2024       Chemotherapy       PACLitaxeL protein-bound (ABRAXANE) 100 mg/m2 = 210 mg in 42 mL infusion  9/16/2024       Chemotherapy       PACLitaxeL protein-bound (ABRAXANE) 100 mg/m2 = 210 mg in 42 mL infusion    MDM includes  :    - Acute or chronic illness or injury that poses a threat to life or bodily function  - Independent review and explanation of 3+ results from unique tests  - Discussion of management and ordering 3+ unique tests  - Extensive discussion of treatment and management  - Prescription drug management  - Drug therapy requiring intensive monitoring for toxicity      Renetta Anderson MD   08/14/2024

## 2024-08-15 ENCOUNTER — TELEPHONE (OUTPATIENT)
Dept: HEMATOLOGY/ONCOLOGY | Facility: CLINIC | Age: 59
End: 2024-08-15
Payer: COMMERCIAL

## 2024-08-15 NOTE — TELEPHONE ENCOUNTER
----- Message from Renetta Anderson MD sent at 8/15/2024 12:36 PM CDT -----  Regarding: RE: Reschedule  Are any of the LINDA's available  ----- Message -----  From: Norma Ramos RN  Sent: 8/15/2024  11:41 AM CDT  To: Renetta Anderson MD  Subject: FW: Reschedule                                   Pt requesting to see you on 8/27, which slot do you want to schedule her in ?  ----- Message -----  From: Alem Sánchez  Sent: 8/15/2024  11:34 AM CDT  To: Monica Jackson Staff  Subject: Reschedule                                       Pt would like to request a callback in regards to rescheduling her appointment .    Pt would like to be seen on August 27 th if possible.    Contact number   [phone]

## 2024-08-15 NOTE — TELEPHONE ENCOUNTER
----- Message from Alem Sánchez sent at 8/15/2024 11:32 AM CDT -----  Regarding: Reschedule  Pt would like to request a callback in regards to rescheduling her appointment .    Pt would like to be seen on August 27 th if possible.    Contact number   [phone]

## 2024-08-15 NOTE — TELEPHONE ENCOUNTER
----- Message from Renetta Anderson MD sent at 8/15/2024  2:33 PM CDT -----  Regarding: RE: Reschedule  Can we move the 9am patient to someone else's schedule? It's a [facility] benign heme patient  ----- Message -----  From: Norma Ramos RN  Sent: 8/15/2024   1:29 PM CDT  To: Renetta Anderson MD  Subject: RE: Reschedule                                   Yes I can schedule her with arjun on 8/27 at 10am... right now she's scheduled to see arjun on 8/29 but she said during her visit yesterday the appt was suppose to be switched to see you instead. Please advise  ----- Message -----  From: Renetta Anderson MD  Sent: 8/15/2024  12:37 PM CDT  To: Norma Ramos RN  Subject: RE: Reschedule                                   Are any of the LINDA's available  ----- Message -----  From: Norma Ramos RN  Sent: 8/15/2024  11:41 AM CDT  To: Renetta Anderson MD  Subject: FW: Reschedule                                   Pt requesting to see you on 8/27, which slot do you want to schedule her in ?  ----- Message -----  From: Alem Sánchez  Sent: 8/15/2024  11:34 AM CDT  To: Monica Willingham  Subject: Reschedule                                       Pt would like to request a callback in regards to rescheduling her appointment .    Pt would like to be seen on August 27 th if possible.    Contact number   [phone]

## 2024-08-16 ENCOUNTER — TELEPHONE (OUTPATIENT)
Dept: HEMATOLOGY/ONCOLOGY | Facility: CLINIC | Age: 59
End: 2024-08-16
Payer: COMMERCIAL

## 2024-08-16 NOTE — TELEPHONE ENCOUNTER
----- Message from Alem Sánchez sent at 8/16/2024 10:32 AM CDT -----  Regarding: Callback  Pt would like to request a callback in regards to rescheduling her lab for the [facility] location .    Contact number  [phone]

## 2024-08-19 ENCOUNTER — LAB VISIT (OUTPATIENT)
Dept: LAB | Facility: HOSPITAL | Age: 59
End: 2024-08-19
Attending: INTERNAL MEDICINE
Payer: COMMERCIAL

## 2024-08-19 ENCOUNTER — INFUSION (OUTPATIENT)
Dept: INFUSION THERAPY | Facility: HOSPITAL | Age: 59
End: 2024-08-19
Payer: COMMERCIAL

## 2024-08-19 VITALS
BODY MASS INDEX: 33.2 KG/M2 | RESPIRATION RATE: 16 BRPM | WEIGHT: 205.69 LBS | HEART RATE: 74 BPM | TEMPERATURE: 98 F | SYSTOLIC BLOOD PRESSURE: 135 MMHG | DIASTOLIC BLOOD PRESSURE: 73 MMHG | OXYGEN SATURATION: 100 %

## 2024-08-19 DIAGNOSIS — Z17.1 MALIGNANT NEOPLASM OF UPPER-OUTER QUADRANT OF LEFT BREAST IN FEMALE, ESTROGEN RECEPTOR NEGATIVE: Primary | ICD-10-CM

## 2024-08-19 DIAGNOSIS — Z17.1 MALIGNANT NEOPLASM OF UPPER-OUTER QUADRANT OF LEFT BREAST IN FEMALE, ESTROGEN RECEPTOR NEGATIVE: ICD-10-CM

## 2024-08-19 DIAGNOSIS — C77.1 SECONDARY MALIGNANT NEOPLASM OF MEDIASTINAL LYMPH NODE: ICD-10-CM

## 2024-08-19 DIAGNOSIS — C50.412 MALIGNANT NEOPLASM OF UPPER-OUTER QUADRANT OF LEFT BREAST IN FEMALE, ESTROGEN RECEPTOR NEGATIVE: Primary | ICD-10-CM

## 2024-08-19 DIAGNOSIS — C78.02 SECONDARY MALIGNANT NEOPLASM OF HILUS OF LEFT LUNG: ICD-10-CM

## 2024-08-19 DIAGNOSIS — C50.412 MALIGNANT NEOPLASM OF UPPER-OUTER QUADRANT OF LEFT BREAST IN FEMALE, ESTROGEN RECEPTOR NEGATIVE: ICD-10-CM

## 2024-08-19 LAB
ALBUMIN SERPL BCP-MCNC: 3.7 G/DL (ref 3.5–5.2)
ALP SERPL-CCNC: 99 U/L (ref 55–135)
ALT SERPL W/O P-5'-P-CCNC: 40 U/L (ref 10–44)
ANION GAP SERPL CALC-SCNC: 8 MMOL/L (ref 8–16)
AST SERPL-CCNC: 29 U/L (ref 10–40)
BASOPHILS # BLD AUTO: 0.02 K/UL (ref 0–0.2)
BASOPHILS NFR BLD: 0.7 % (ref 0–1.9)
BILIRUB SERPL-MCNC: 0.3 MG/DL (ref 0.1–1)
BUN SERPL-MCNC: 12 MG/DL (ref 6–20)
CALCIUM SERPL-MCNC: 9.5 MG/DL (ref 8.7–10.5)
CHLORIDE SERPL-SCNC: 100 MMOL/L (ref 95–110)
CO2 SERPL-SCNC: 29 MMOL/L (ref 23–29)
CREAT SERPL-MCNC: 0.8 MG/DL (ref 0.5–1.4)
DIFFERENTIAL METHOD BLD: ABNORMAL
EOSINOPHIL # BLD AUTO: 0 K/UL (ref 0–0.5)
EOSINOPHIL NFR BLD: 0 % (ref 0–8)
ERYTHROCYTE [DISTWIDTH] IN BLOOD BY AUTOMATED COUNT: 12.9 % (ref 11.5–14.5)
EST. GFR  (NO RACE VARIABLE): >60 ML/MIN/1.73 M^2
GLUCOSE SERPL-MCNC: 98 MG/DL (ref 70–110)
HCT VFR BLD AUTO: 35.1 % (ref 37–48.5)
HGB BLD-MCNC: 12.2 G/DL (ref 12–16)
IMM GRANULOCYTES # BLD AUTO: 0.02 K/UL (ref 0–0.04)
IMM GRANULOCYTES NFR BLD AUTO: 0.7 % (ref 0–0.5)
LYMPHOCYTES # BLD AUTO: 1.2 K/UL (ref 1–4.8)
LYMPHOCYTES NFR BLD: 40.5 % (ref 18–48)
MCH RBC QN AUTO: 34.3 PG (ref 27–31)
MCHC RBC AUTO-ENTMCNC: 34.8 G/DL (ref 32–36)
MCV RBC AUTO: 99 FL (ref 82–98)
MONOCYTES # BLD AUTO: 0.3 K/UL (ref 0.3–1)
MONOCYTES NFR BLD: 11 % (ref 4–15)
NEUTROPHILS # BLD AUTO: 1.4 K/UL (ref 1.8–7.7)
NEUTROPHILS NFR BLD: 47.1 % (ref 38–73)
NRBC BLD-RTO: 0 /100 WBC
PLATELET # BLD AUTO: 263 K/UL (ref 150–450)
PMV BLD AUTO: 8.1 FL (ref 9.2–12.9)
POTASSIUM SERPL-SCNC: 3.6 MMOL/L (ref 3.5–5.1)
PROT SERPL-MCNC: 6.9 G/DL (ref 6–8.4)
RBC # BLD AUTO: 3.56 M/UL (ref 4–5.4)
SODIUM SERPL-SCNC: 137 MMOL/L (ref 136–145)
WBC # BLD AUTO: 3.01 K/UL (ref 3.9–12.7)

## 2024-08-19 PROCEDURE — 36415 COLL VENOUS BLD VENIPUNCTURE: CPT | Performed by: INTERNAL MEDICINE

## 2024-08-19 PROCEDURE — 96413 CHEMO IV INFUSION 1 HR: CPT

## 2024-08-19 PROCEDURE — 80053 COMPREHEN METABOLIC PANEL: CPT | Performed by: INTERNAL MEDICINE

## 2024-08-19 PROCEDURE — 63600175 PHARM REV CODE 636 W HCPCS: Performed by: INTERNAL MEDICINE

## 2024-08-19 PROCEDURE — A4216 STERILE WATER/SALINE, 10 ML: HCPCS | Performed by: INTERNAL MEDICINE

## 2024-08-19 PROCEDURE — 85025 COMPLETE CBC W/AUTO DIFF WBC: CPT | Performed by: INTERNAL MEDICINE

## 2024-08-19 PROCEDURE — 25000003 PHARM REV CODE 250: Performed by: INTERNAL MEDICINE

## 2024-08-19 RX ORDER — DIPHENHYDRAMINE HYDROCHLORIDE 50 MG/ML
50 INJECTION INTRAMUSCULAR; INTRAVENOUS ONCE AS NEEDED
Status: DISCONTINUED | OUTPATIENT
Start: 2024-08-19 | End: 2024-08-19 | Stop reason: HOSPADM

## 2024-08-19 RX ORDER — HEPARIN 100 UNIT/ML
500 SYRINGE INTRAVENOUS
Status: DISCONTINUED | OUTPATIENT
Start: 2024-08-19 | End: 2024-08-19 | Stop reason: HOSPADM

## 2024-08-19 RX ORDER — SODIUM CHLORIDE 0.9 % (FLUSH) 0.9 %
10 SYRINGE (ML) INJECTION
Status: DISCONTINUED | OUTPATIENT
Start: 2024-08-19 | End: 2024-08-19 | Stop reason: HOSPADM

## 2024-08-19 RX ORDER — PROCHLORPERAZINE EDISYLATE 5 MG/ML
10 INJECTION INTRAMUSCULAR; INTRAVENOUS ONCE AS NEEDED
Status: DISCONTINUED | OUTPATIENT
Start: 2024-08-19 | End: 2024-08-19 | Stop reason: HOSPADM

## 2024-08-19 RX ORDER — EPINEPHRINE 0.3 MG/.3ML
0.3 INJECTION SUBCUTANEOUS ONCE AS NEEDED
Status: DISCONTINUED | OUTPATIENT
Start: 2024-08-19 | End: 2024-08-19 | Stop reason: HOSPADM

## 2024-08-19 RX ADMIN — SODIUM CHLORIDE: 9 INJECTION, SOLUTION INTRAVENOUS at 09:08

## 2024-08-19 RX ADMIN — HEPARIN 500 UNITS: 100 SYRINGE at 11:08

## 2024-08-19 RX ADMIN — PACLITAXEL 200 MG: 100 INJECTION, POWDER, LYOPHILIZED, FOR SUSPENSION INTRAVENOUS at 10:08

## 2024-08-19 RX ADMIN — Medication 10 ML: at 11:08

## 2024-08-19 NOTE — PLAN OF CARE
0945 Patient here for C2D15 abraxane. Labs, meds and hx reviewed. ANC is 1.4, MD notified and will put ok to treat order in. Port accessed and NS started at 25ml/hr. [place] and water provided.

## 2024-08-19 NOTE — PLAN OF CARE
1150 Patient tolerated abraxane with no s/s of reaction and no complaints. Port flushed, heparin locked and deaccessed. Bandaid to port site. AVS and appt calendar given to patient and we discussed upcoming appts. She ambulated out with no issues.

## 2024-08-26 ENCOUNTER — HOSPITAL ENCOUNTER (OUTPATIENT)
Dept: RADIOLOGY | Facility: HOSPITAL | Age: 59
Discharge: HOME | End: 2024-08-26
Attending: INTERNAL MEDICINE
Payer: COMMERCIAL

## 2024-08-26 DIAGNOSIS — Z17.1 MALIGNANT NEOPLASM OF UPPER-OUTER QUADRANT OF LEFT BREAST IN FEMALE, ESTROGEN RECEPTOR NEGATIVE: ICD-10-CM

## 2024-08-26 DIAGNOSIS — C78.02 SECONDARY MALIGNANT NEOPLASM OF HILUS OF LEFT LUNG: ICD-10-CM

## 2024-08-26 DIAGNOSIS — C50.412 MALIGNANT NEOPLASM OF UPPER-OUTER QUADRANT OF LEFT BREAST IN FEMALE, ESTROGEN RECEPTOR NEGATIVE: ICD-10-CM

## 2024-08-26 DIAGNOSIS — C77.1 SECONDARY MALIGNANT NEOPLASM OF MEDIASTINAL LYMPH NODE: ICD-10-CM

## 2024-08-26 LAB — POCT GLUCOSE: 112 MG/DL (ref 70–110)

## 2024-08-26 PROCEDURE — 78815 PET IMAGE W/CT SKULL-THIGH: CPT | Mod: TC

## 2024-08-26 PROCEDURE — A9552 F18 FDG: HCPCS | Performed by: INTERNAL MEDICINE

## 2024-08-26 PROCEDURE — 78815 PET IMAGE W/CT SKULL-THIGH: CPT | Mod: 26,PI,, | Performed by: NUCLEAR MEDICINE

## 2024-08-26 RX ORDER — FLUDEOXYGLUCOSE F18 500 MCI/ML
12.9 INJECTION INTRAVENOUS
Status: COMPLETED | OUTPATIENT
Start: 2024-08-26 | End: 2024-08-26

## 2024-08-26 RX ADMIN — FLUDEOXYGLUCOSE F-18 12.9 MILLICURIE: 500 INJECTION INTRAVENOUS at 10:08

## 2024-08-29 ENCOUNTER — OFFICE VISIT (OUTPATIENT)
Dept: HEMATOLOGY/ONCOLOGY | Facility: CLINIC | Age: 59
End: 2024-08-29
Payer: COMMERCIAL

## 2024-08-29 VITALS
DIASTOLIC BLOOD PRESSURE: 63 MMHG | HEART RATE: 66 BPM | OXYGEN SATURATION: 100 % | TEMPERATURE: 98 F | RESPIRATION RATE: 16 BRPM | BODY MASS INDEX: 32.85 KG/M2 | SYSTOLIC BLOOD PRESSURE: 138 MMHG | HEIGHT: 66 IN | WEIGHT: 204.38 LBS

## 2024-08-29 DIAGNOSIS — Z17.1 MALIGNANT NEOPLASM OF UPPER-OUTER QUADRANT OF LEFT BREAST IN FEMALE, ESTROGEN RECEPTOR NEGATIVE: Primary | ICD-10-CM

## 2024-08-29 DIAGNOSIS — C77.1 SECONDARY MALIGNANT NEOPLASM OF MEDIASTINAL LYMPH NODE: ICD-10-CM

## 2024-08-29 DIAGNOSIS — C78.02 SECONDARY MALIGNANT NEOPLASM OF HILUS OF LEFT LUNG: ICD-10-CM

## 2024-08-29 DIAGNOSIS — C50.412 MALIGNANT NEOPLASM OF UPPER-OUTER QUADRANT OF LEFT BREAST IN FEMALE, ESTROGEN RECEPTOR NEGATIVE: Primary | ICD-10-CM

## 2024-08-29 PROCEDURE — 1159F MED LIST DOCD IN RCRD: CPT | Mod: CPTII,S$GLB,, | Performed by: INTERNAL MEDICINE

## 2024-08-29 PROCEDURE — 99215 OFFICE O/P EST HI 40 MIN: CPT | Mod: S$GLB,,, | Performed by: INTERNAL MEDICINE

## 2024-08-29 PROCEDURE — 3008F BODY MASS INDEX DOCD: CPT | Mod: CPTII,S$GLB,, | Performed by: INTERNAL MEDICINE

## 2024-08-29 PROCEDURE — 3078F DIAST BP <80 MM HG: CPT | Mod: CPTII,S$GLB,, | Performed by: INTERNAL MEDICINE

## 2024-08-29 PROCEDURE — G2211 COMPLEX E/M VISIT ADD ON: HCPCS | Mod: S$GLB,,, | Performed by: INTERNAL MEDICINE

## 2024-08-29 PROCEDURE — 3075F SYST BP GE 130 - 139MM HG: CPT | Mod: CPTII,S$GLB,, | Performed by: INTERNAL MEDICINE

## 2024-08-29 PROCEDURE — 99999 PR PBB SHADOW E&M-EST. PATIENT-LVL III: CPT | Mod: PBBFAC,,, | Performed by: INTERNAL MEDICINE

## 2024-08-29 RX ORDER — PROCHLORPERAZINE EDISYLATE 5 MG/ML
10 INJECTION INTRAMUSCULAR; INTRAVENOUS ONCE AS NEEDED
Start: 2024-09-02

## 2024-08-29 RX ORDER — HEPARIN 100 UNIT/ML
500 SYRINGE INTRAVENOUS
OUTPATIENT
Start: 2024-09-02

## 2024-08-29 RX ORDER — DIPHENHYDRAMINE HYDROCHLORIDE 50 MG/ML
50 INJECTION INTRAMUSCULAR; INTRAVENOUS ONCE AS NEEDED
OUTPATIENT
Start: 2024-09-02

## 2024-08-29 RX ORDER — SODIUM CHLORIDE 0.9 % (FLUSH) 0.9 %
10 SYRINGE (ML) INJECTION
OUTPATIENT
Start: 2024-09-02

## 2024-08-29 RX ORDER — EPINEPHRINE 0.3 MG/.3ML
0.3 INJECTION SUBCUTANEOUS ONCE AS NEEDED
OUTPATIENT
Start: 2024-09-02

## 2024-08-29 NOTE — PROGRESS NOTES
"  [facility]/ [facility]   at [facility] Note:      Chief Complaint:   Encounter Diagnoses   Name Primary?    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 female who presents today for follow up for stage IV TNBC. She presents prior to C3 of Abraxane. Since last visit, she reports feeling well with no acute complaints or concerns. She is concerned about her interval PET noted below. Discussed plan for obtaining outside path and imaging with her feeling less anxious with that plan.       Oncology History   Patient with TNBC diagnosed in 2022 in [place], [place]   Routine screening ultrasound demonstrated 11mm mass in the L breast   Biopsy 9/30/22: IDC, grade 3; ER-/DE-/HER2 0; PDL1 CPS <10    NACT with weekly carboplatin/paclitaxel x 12  Bilateral mastectomy March 2022: residual IDC, 1.7mm; 0 LN+  Adjuvant AC x 4 completed 5/23/23    Routine PET imaging without new disease  Follow up June 2024 demonstrated "elevated tumor markers" with high  and LDH  PET 6/11/24: bilateral hilar and mediastinal LAD, 2.7cm KRIS mass  Bronchoscopy 6/18/24: metastatic carcinoma 4R/11L c/w breast carcinoma     Started Keytruda/Abraxane 6/20/24       No family history on file.  No past medical history on file.  No past surgical history on file.    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    HENT: Negative.     Respiratory: Negative.     Cardiovascular: Negative.    Gastrointestinal: Negative.    Musculoskeletal: Negative.    Neurological: Negative.    All other systems reviewed and are negative.    General Appearance:    Alert, cooperative, no distress, appears stated age   Head:    Normocephalic, without obvious abnormality, atraumatic   Eyes:    PERRL, conjunctiva/corneas clear   Nose:   Nares normal, septum midline   Throat:   Lips, mucosa, and tongue normal; teeth and gums normal   Lungs:     Respirations unlabored   Extremities:   Extremities normal, atraumatic, no cyanosis or edema   Pulses:   2+ and symmetric all extremities   Skin:   Skin color, texture, turgor normal, no rashes or lesions   Neurologic:   CNII-XII intact, normal gait       Pertinent Labs & Imaging:  Pathology Results  (Last 30 days)      None            No results found for this or any previous visit (from the past 24 hour(s)).      Imaging:    PET 8/26/24    Impression:     In this patient with history of breast cancer status post bilateral mastectomy and axillary dissection, there is soft tissue thickening with mild FDG radiotracer uptake in the left chest wall which can represent post radiation changes or recurrence.  No prior imaging comparison.     Multiple mediastinal and hilar hypermetabolic lymphadenopathy.     Numerous hypermetabolic sclerotic osseous lesions  throughout axial and appendicular skeleton, suggesting osseous metastasis.     Right upper and middle lobe pulmonary nodules largest measure 9 mm.  Recommend correlation with prior imaging.      Assessment & Plan:    1. Malignant neoplasm of upper-outer quadrant of left breast in female, estrogen receptor negative    2. 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 Secondary malignant neoplasm of mediastinal lymph node        Patient with newly diagnosed stage IV TNBC   Previously receiving Keytruda/Abraxane but review of records show PDL1 CPS <10. No indication for Keytruda based on Keynote 355  Recommend weekly abraxane 3 on/ 1 off pending 2nd opinion on pathology and outside imaging  PET CT on 8/26/24 showing interval decrease in thoracic disease burden but mention of sclerotic disease in pelvis not mentioned on outside PET report  Breast navigation notified of need for obtained outside path and imaging  Continue Cryotherapy gloves with treatment, reports improved symptoms  Follow up in 2 weeks with me to discuss outside path and imaging review, if clear progression noted may consider transition to Enhertu given biopsy from 9/2022 noted HER2 1+          Route Chart for Scheduling    Med Onc Chart Routing      Follow up with physician 2 weeks. 2 Weeks, Dr. Anderson.   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   noted on 7/16/2024  Associated diagnosis: Secondary malignant neoplasm of mediastinal lymph node   noted on 7/16/2024   Line of treatment:  First Line  Treatment Goal: Palliative     Upcoming Treatment Dates - OP BREAST NAB-PACLITAXEL WEEKLY    9/2/2024       Chemotherapy       PACLitaxeL protein-bound (ABRAXANE) 100 mg/m2 = 210 mg in 42 mL infusion  9/9/2024       Chemotherapy       PACLitaxeL protein-bound (ABRAXANE) 100 mg/m2 = 210 mg in 42 mL infusion  9/16/2024       Chemotherapy       PACLitaxeL protein-bound (ABRAXANE) 100 mg/m2 = 210 mg in 42 mL infusion  9/30/2024       Chemotherapy       PACLitaxeL protein-bound (ABRAXANE) 100 mg/m2 = 210 mg in 42 mL infusion    Supportive Plan Information  OP ZOLEDRONIC ACID (ZOMETA) Q4W Renetta Anderson MD   Associated Diagnosis: Malignant neoplasm of upper-outer quadrant of left breast in female, estrogen receptor negative Stage IV rcTX, cNX, pM1, G3, ER-, [place]-, HER2- noted on 7/16/2024   Line of treatment: Supportive Care   Treatment goal: Palliative     Upcoming Treatment Dates - OP ZOLEDRONIC ACID (ZOMETA) Q4W    9/16/2024       Medications       zoledronic 4 mg/100 mL infusion 4 mg  10/14/2024       Medications       zoledronic 4 mg/100 mL infusion 4 mg  11/11/2024       Medications       zoledronic 4 mg/100 mL infusion 4 mg  12/9/2024       Medications       zoledronic 4 mg/100 mL infusion 4 mg    MDM includes  :    - Acute or chronic illness or injury that poses a threat to life or bodily function  - Independent review and explanation of 3+ results from unique tests  - Discussion of management and ordering 3+ unique tests  - Extensive discussion of treatment and management  - Prescription drug management  - Drug therapy requiring intensive monitoring for toxicity      Oliver Ybarra DO   08/29/2024

## 2024-08-30 ENCOUNTER — PATIENT MESSAGE (OUTPATIENT)
Dept: INFUSION THERAPY | Facility: HOSPITAL | Age: 59
End: 2024-08-30
Payer: COMMERCIAL

## 2024-09-03 ENCOUNTER — LAB VISIT (OUTPATIENT)
Dept: LAB | Facility: HOSPITAL | Age: 59
End: 2024-09-03
Attending: INTERNAL MEDICINE
Payer: COMMERCIAL

## 2024-09-03 DIAGNOSIS — C78.02 SECONDARY MALIGNANT NEOPLASM OF HILUS OF LEFT LUNG: ICD-10-CM

## 2024-09-03 DIAGNOSIS — C50.412 MALIGNANT NEOPLASM OF UPPER-OUTER QUADRANT OF LEFT BREAST IN FEMALE, ESTROGEN RECEPTOR NEGATIVE: ICD-10-CM

## 2024-09-03 DIAGNOSIS — Z17.1 MALIGNANT NEOPLASM OF UPPER-OUTER QUADRANT OF LEFT BREAST IN FEMALE, ESTROGEN RECEPTOR NEGATIVE: ICD-10-CM

## 2024-09-03 LAB
ALBUMIN SERPL BCP-MCNC: 3.8 G/DL (ref 3.5–5.2)
ALP SERPL-CCNC: 105 U/L (ref 55–135)
ALT SERPL W/O P-5'-P-CCNC: 27 U/L (ref 10–44)
ANION GAP SERPL CALC-SCNC: 9 MMOL/L (ref 8–16)
AST SERPL-CCNC: 24 U/L (ref 10–40)
BASOPHILS # BLD AUTO: 0.03 K/UL (ref 0–0.2)
BASOPHILS NFR BLD: 0.7 % (ref 0–1.9)
BILIRUB SERPL-MCNC: 0.3 MG/DL (ref 0.1–1)
BUN SERPL-MCNC: 11 MG/DL (ref 6–20)
CALCIUM SERPL-MCNC: 9.6 MG/DL (ref 8.7–10.5)
CHLORIDE SERPL-SCNC: 100 MMOL/L (ref 95–110)
CO2 SERPL-SCNC: 30 MMOL/L (ref 23–29)
CREAT SERPL-MCNC: 0.9 MG/DL (ref 0.5–1.4)
DIFFERENTIAL METHOD BLD: ABNORMAL
EOSINOPHIL # BLD AUTO: 0 K/UL (ref 0–0.5)
EOSINOPHIL NFR BLD: 0 % (ref 0–8)
ERYTHROCYTE [DISTWIDTH] IN BLOOD BY AUTOMATED COUNT: 13.6 % (ref 11.5–14.5)
EST. GFR  (NO RACE VARIABLE): >60 ML/MIN/1.73 M^2
GLUCOSE SERPL-MCNC: 96 MG/DL (ref 70–110)
HCT VFR BLD AUTO: 38.5 % (ref 37–48.5)
HGB BLD-MCNC: 13.2 G/DL (ref 12–16)
IMM GRANULOCYTES # BLD AUTO: 0.03 K/UL (ref 0–0.04)
IMM GRANULOCYTES NFR BLD AUTO: 0.7 % (ref 0–0.5)
LYMPHOCYTES # BLD AUTO: 1 K/UL (ref 1–4.8)
LYMPHOCYTES NFR BLD: 23.9 % (ref 18–48)
MCH RBC QN AUTO: 33.7 PG (ref 27–31)
MCHC RBC AUTO-ENTMCNC: 34.3 G/DL (ref 32–36)
MCV RBC AUTO: 98 FL (ref 82–98)
MONOCYTES # BLD AUTO: 0.6 K/UL (ref 0.3–1)
MONOCYTES NFR BLD: 14.8 % (ref 4–15)
NEUTROPHILS # BLD AUTO: 2.6 K/UL (ref 1.8–7.7)
NEUTROPHILS NFR BLD: 59.9 % (ref 38–73)
NRBC BLD-RTO: 0 /100 WBC
PLATELET # BLD AUTO: 243 K/UL (ref 150–450)
PMV BLD AUTO: 7.8 FL (ref 9.2–12.9)
POTASSIUM SERPL-SCNC: 3.8 MMOL/L (ref 3.5–5.1)
PROT SERPL-MCNC: 7.2 G/DL (ref 6–8.4)
RBC # BLD AUTO: 3.92 M/UL (ref 4–5.4)
SODIUM SERPL-SCNC: 139 MMOL/L (ref 136–145)
WBC # BLD AUTO: 4.27 K/UL (ref 3.9–12.7)

## 2024-09-03 PROCEDURE — 85025 COMPLETE CBC W/AUTO DIFF WBC: CPT | Performed by: INTERNAL MEDICINE

## 2024-09-03 PROCEDURE — 36415 COLL VENOUS BLD VENIPUNCTURE: CPT | Performed by: INTERNAL MEDICINE

## 2024-09-03 PROCEDURE — 80053 COMPREHEN METABOLIC PANEL: CPT | Performed by: INTERNAL MEDICINE

## 2024-09-04 ENCOUNTER — INFUSION (OUTPATIENT)
Dept: INFUSION THERAPY | Facility: HOSPITAL | Age: 59
End: 2024-09-04
Attending: INTERNAL MEDICINE
Payer: COMMERCIAL

## 2024-09-04 ENCOUNTER — HOSPITAL ENCOUNTER (OUTPATIENT)
Dept: RADIOLOGY | Facility: HOSPITAL | Age: 59
Discharge: HOME | End: 2024-09-04
Attending: INTERNAL MEDICINE
Payer: COMMERCIAL

## 2024-09-04 VITALS
TEMPERATURE: 98 F | RESPIRATION RATE: 16 BRPM | SYSTOLIC BLOOD PRESSURE: 117 MMHG | DIASTOLIC BLOOD PRESSURE: 73 MMHG | HEART RATE: 80 BPM | OXYGEN SATURATION: 98 %

## 2024-09-04 DIAGNOSIS — C50.412 MALIGNANT NEOPLASM OF UPPER-OUTER QUADRANT OF LEFT BREAST IN FEMALE, ESTROGEN RECEPTOR NEGATIVE: Primary | ICD-10-CM

## 2024-09-04 DIAGNOSIS — Z17.1 MALIGNANT NEOPLASM OF UPPER-OUTER QUADRANT OF LEFT BREAST IN FEMALE, ESTROGEN RECEPTOR NEGATIVE: Primary | ICD-10-CM

## 2024-09-04 DIAGNOSIS — C78.02 SECONDARY MALIGNANT NEOPLASM OF HILUS OF LEFT LUNG: ICD-10-CM

## 2024-09-04 DIAGNOSIS — C77.1 SECONDARY MALIGNANT NEOPLASM OF MEDIASTINAL LYMPH NODE: ICD-10-CM

## 2024-09-04 PROCEDURE — 96413 CHEMO IV INFUSION 1 HR: CPT

## 2024-09-04 PROCEDURE — 63600175 PHARM REV CODE 636 W HCPCS: Performed by: INTERNAL MEDICINE

## 2024-09-04 RX ORDER — HEPARIN 100 UNIT/ML
500 SYRINGE INTRAVENOUS
Status: DISCONTINUED | OUTPATIENT
Start: 2024-09-04 | End: 2024-09-04 | Stop reason: HOSPADM

## 2024-09-04 RX ORDER — PROCHLORPERAZINE EDISYLATE 5 MG/ML
10 INJECTION INTRAMUSCULAR; INTRAVENOUS ONCE AS NEEDED
Status: DISCONTINUED | OUTPATIENT
Start: 2024-09-04 | End: 2024-09-04 | Stop reason: HOSPADM

## 2024-09-04 RX ADMIN — HEPARIN 500 UNITS: 100 SYRINGE at 11:09

## 2024-09-04 RX ADMIN — PACLITAXEL 200 MG: 100 INJECTION, POWDER, LYOPHILIZED, FOR SUSPENSION INTRAVENOUS at 10:09

## 2024-09-06 DIAGNOSIS — C50.412 MALIGNANT NEOPLASM OF UPPER-OUTER QUADRANT OF LEFT BREAST IN FEMALE, ESTROGEN RECEPTOR NEGATIVE: Primary | ICD-10-CM

## 2024-09-06 DIAGNOSIS — Z17.1 MALIGNANT NEOPLASM OF UPPER-OUTER QUADRANT OF LEFT BREAST IN FEMALE, ESTROGEN RECEPTOR NEGATIVE: Primary | ICD-10-CM

## 2024-09-09 RX ORDER — HEPARIN 100 UNIT/ML
500 SYRINGE INTRAVENOUS
Status: CANCELLED | OUTPATIENT
Start: 2024-09-12

## 2024-09-09 RX ORDER — EPINEPHRINE 0.3 MG/.3ML
0.3 INJECTION SUBCUTANEOUS ONCE AS NEEDED
Status: CANCELLED | OUTPATIENT
Start: 2024-09-12

## 2024-09-09 RX ORDER — PROCHLORPERAZINE EDISYLATE 5 MG/ML
10 INJECTION INTRAMUSCULAR; INTRAVENOUS ONCE AS NEEDED
Status: CANCELLED
Start: 2024-09-12

## 2024-09-09 RX ORDER — SODIUM CHLORIDE 0.9 % (FLUSH) 0.9 %
10 SYRINGE (ML) INJECTION
Status: CANCELLED | OUTPATIENT
Start: 2024-09-12

## 2024-09-09 RX ORDER — DIPHENHYDRAMINE HYDROCHLORIDE 50 MG/ML
50 INJECTION INTRAMUSCULAR; INTRAVENOUS ONCE AS NEEDED
Status: CANCELLED | OUTPATIENT
Start: 2024-09-12

## 2024-09-10 ENCOUNTER — TELEPHONE (OUTPATIENT)
Dept: HEMATOLOGY/ONCOLOGY | Facility: CLINIC | Age: 59
End: 2024-09-10
Payer: COMMERCIAL

## 2024-09-10 ENCOUNTER — LAB VISIT (OUTPATIENT)
Dept: LAB | Facility: HOSPITAL | Age: 59
End: 2024-09-10
Attending: INTERNAL MEDICINE
Payer: COMMERCIAL

## 2024-09-10 DIAGNOSIS — C50.412 MALIGNANT NEOPLASM OF UPPER-OUTER QUADRANT OF LEFT BREAST IN FEMALE, ESTROGEN RECEPTOR NEGATIVE: ICD-10-CM

## 2024-09-10 DIAGNOSIS — C78.02 SECONDARY MALIGNANT NEOPLASM OF HILUS OF LEFT LUNG: ICD-10-CM

## 2024-09-10 DIAGNOSIS — Z17.1 MALIGNANT NEOPLASM OF UPPER-OUTER QUADRANT OF LEFT BREAST IN FEMALE, ESTROGEN RECEPTOR NEGATIVE: ICD-10-CM

## 2024-09-10 LAB
ALBUMIN SERPL BCP-MCNC: 3.6 G/DL (ref 3.5–5.2)
ALP SERPL-CCNC: 100 U/L (ref 55–135)
ALT SERPL W/O P-5'-P-CCNC: 44 U/L (ref 10–44)
ANION GAP SERPL CALC-SCNC: 9 MMOL/L (ref 8–16)
AST SERPL-CCNC: 33 U/L (ref 10–40)
BASOPHILS # BLD AUTO: 0.03 K/UL (ref 0–0.2)
BASOPHILS NFR BLD: 0.8 % (ref 0–1.9)
BILIRUB SERPL-MCNC: 0.3 MG/DL (ref 0.1–1)
BUN SERPL-MCNC: 9 MG/DL (ref 6–20)
CALCIUM SERPL-MCNC: 9.3 MG/DL (ref 8.7–10.5)
CHLORIDE SERPL-SCNC: 100 MMOL/L (ref 95–110)
CO2 SERPL-SCNC: 28 MMOL/L (ref 23–29)
CREAT SERPL-MCNC: 0.9 MG/DL (ref 0.5–1.4)
DIFFERENTIAL METHOD BLD: ABNORMAL
EOSINOPHIL # BLD AUTO: 0 K/UL (ref 0–0.5)
EOSINOPHIL NFR BLD: 0 % (ref 0–8)
ERYTHROCYTE [DISTWIDTH] IN BLOOD BY AUTOMATED COUNT: 13.2 % (ref 11.5–14.5)
EST. GFR  (NO RACE VARIABLE): >60 ML/MIN/1.73 M^2
GLUCOSE SERPL-MCNC: 119 MG/DL (ref 70–110)
HCT VFR BLD AUTO: 35 % (ref 37–48.5)
HGB BLD-MCNC: 12.4 G/DL (ref 12–16)
IMM GRANULOCYTES # BLD AUTO: 0.02 K/UL (ref 0–0.04)
IMM GRANULOCYTES NFR BLD AUTO: 0.5 % (ref 0–0.5)
LYMPHOCYTES # BLD AUTO: 1 K/UL (ref 1–4.8)
LYMPHOCYTES NFR BLD: 25.3 % (ref 18–48)
MCH RBC QN AUTO: 34 PG (ref 27–31)
MCHC RBC AUTO-ENTMCNC: 35.4 G/DL (ref 32–36)
MCV RBC AUTO: 96 FL (ref 82–98)
MONOCYTES # BLD AUTO: 0.4 K/UL (ref 0.3–1)
MONOCYTES NFR BLD: 10.2 % (ref 4–15)
NEUTROPHILS # BLD AUTO: 2.5 K/UL (ref 1.8–7.7)
NEUTROPHILS NFR BLD: 63.2 % (ref 38–73)
NRBC BLD-RTO: 0 /100 WBC
PLATELET # BLD AUTO: 207 K/UL (ref 150–450)
PMV BLD AUTO: 8 FL (ref 9.2–12.9)
POTASSIUM SERPL-SCNC: 3.6 MMOL/L (ref 3.5–5.1)
PROT SERPL-MCNC: 7.2 G/DL (ref 6–8.4)
RBC # BLD AUTO: 3.65 M/UL (ref 4–5.4)
SODIUM SERPL-SCNC: 137 MMOL/L (ref 136–145)
WBC # BLD AUTO: 3.91 K/UL (ref 3.9–12.7)

## 2024-09-10 PROCEDURE — 85025 COMPLETE CBC W/AUTO DIFF WBC: CPT | Performed by: INTERNAL MEDICINE

## 2024-09-10 PROCEDURE — 36415 COLL VENOUS BLD VENIPUNCTURE: CPT | Performed by: INTERNAL MEDICINE

## 2024-09-10 PROCEDURE — 80053 COMPREHEN METABOLIC PANEL: CPT | Performed by: INTERNAL MEDICINE

## 2024-09-12 ENCOUNTER — INFUSION (OUTPATIENT)
Dept: INFUSION THERAPY | Facility: HOSPITAL | Age: 59
End: 2024-09-12
Attending: INTERNAL MEDICINE
Payer: COMMERCIAL

## 2024-09-12 ENCOUNTER — OFFICE VISIT (OUTPATIENT)
Dept: HEMATOLOGY/ONCOLOGY | Facility: CLINIC | Age: 59
End: 2024-09-12
Payer: COMMERCIAL

## 2024-09-12 VITALS
TEMPERATURE: 98 F | OXYGEN SATURATION: 96 % | RESPIRATION RATE: 16 BRPM | HEART RATE: 75 BPM | SYSTOLIC BLOOD PRESSURE: 131 MMHG | DIASTOLIC BLOOD PRESSURE: 70 MMHG

## 2024-09-12 VITALS
WEIGHT: 208.13 LBS | TEMPERATURE: 97 F | HEART RATE: 75 BPM | HEIGHT: 66 IN | BODY MASS INDEX: 33.45 KG/M2 | DIASTOLIC BLOOD PRESSURE: 71 MMHG | OXYGEN SATURATION: 99 % | SYSTOLIC BLOOD PRESSURE: 151 MMHG

## 2024-09-12 DIAGNOSIS — Z17.1 MALIGNANT NEOPLASM OF UPPER-OUTER QUADRANT OF LEFT BREAST IN FEMALE, ESTROGEN RECEPTOR NEGATIVE: Primary | ICD-10-CM

## 2024-09-12 DIAGNOSIS — C78.02 SECONDARY MALIGNANT NEOPLASM OF HILUS OF LEFT LUNG: ICD-10-CM

## 2024-09-12 DIAGNOSIS — C50.412 MALIGNANT NEOPLASM OF UPPER-OUTER QUADRANT OF LEFT BREAST IN FEMALE, ESTROGEN RECEPTOR NEGATIVE: Primary | ICD-10-CM

## 2024-09-12 DIAGNOSIS — C77.1 SECONDARY MALIGNANT NEOPLASM OF MEDIASTINAL LYMPH NODE: ICD-10-CM

## 2024-09-12 PROCEDURE — 99999 PR PBB SHADOW E&M-EST. PATIENT-LVL III: CPT | Mod: PBBFAC,,, | Performed by: NURSE PRACTITIONER

## 2024-09-12 PROCEDURE — 99215 OFFICE O/P EST HI 40 MIN: CPT | Mod: S$GLB,,, | Performed by: NURSE PRACTITIONER

## 2024-09-12 PROCEDURE — 3008F BODY MASS INDEX DOCD: CPT | Mod: CPTII,S$GLB,, | Performed by: NURSE PRACTITIONER

## 2024-09-12 PROCEDURE — 3077F SYST BP >= 140 MM HG: CPT | Mod: CPTII,S$GLB,, | Performed by: NURSE PRACTITIONER

## 2024-09-12 PROCEDURE — G2211 COMPLEX E/M VISIT ADD ON: HCPCS | Mod: S$GLB,,, | Performed by: NURSE PRACTITIONER

## 2024-09-12 PROCEDURE — 3078F DIAST BP <80 MM HG: CPT | Mod: CPTII,S$GLB,, | Performed by: NURSE PRACTITIONER

## 2024-09-12 PROCEDURE — 63600175 PHARM REV CODE 636 W HCPCS: Performed by: INTERNAL MEDICINE

## 2024-09-12 PROCEDURE — 96413 CHEMO IV INFUSION 1 HR: CPT

## 2024-09-12 RX ORDER — HEPARIN 100 UNIT/ML
500 SYRINGE INTRAVENOUS
Status: DISCONTINUED | OUTPATIENT
Start: 2024-09-12 | End: 2024-09-12 | Stop reason: HOSPADM

## 2024-09-12 RX ADMIN — HEPARIN 500 UNITS: 100 SYRINGE at 11:09

## 2024-09-12 RX ADMIN — PACLITAXEL 200 MG: 100 INJECTION, POWDER, LYOPHILIZED, FOR SUSPENSION INTRAVENOUS at 10:09

## 2024-09-12 NOTE — PROGRESS NOTES
"  [facility]/ [facility]   at [facility] Note:      Chief Complaint:   Encounter Diagnoses   Name Primary?    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 female who presents today for follow up for stage IV TNBC. She presents today for  C3, D8  of Abraxane. Since last visit, she reports feeling well with no acute complaints or concerns. She is concerned about her interval PET noted below. Discussed plan for obtaining outside path and imaging with her feeling less anxious with that plan. Outside PET has not been able to be read yet by radiology and pathology still pending    She is eating and drinking well, No  n/v/d/c  Occasional fatigue, Exercising on treadmill regularly  No fever or signs of infection  No neuropathy  No chest pain or sob    Per Dr. Anderson's note:  Oncology History   Patient with TNBC diagnosed in 2022 in [place], [place]   Routine screening ultrasound demonstrated 11mm mass in the L breast   Biopsy 9/30/22: IDC, grade 3; ER-/ND-/HER2 0; PDL1 CPS <10    NACT with weekly carboplatin/paclitaxel x 12  Bilateral mastectomy March 2022: residual IDC, 1.7mm; 0 LN+  Adjuvant AC x 4 completed 5/23/23    Routine PET imaging without new disease  Follow up June 2024 demonstrated "elevated tumor markers" with high  and LDH  PET 6/11/24: bilateral hilar and mediastinal LAD, 2.7cm KRIS mass  Bronchoscopy 6/18/24: metastatic carcinoma 4R/11L c/w breast carcinoma "     Started Keytruda/Abraxane 6/20/24     No family history on file.  No past medical history on file.  No past surgical history on file.    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        Mild fatigue, able to do adls   HENT: Negative.     Respiratory: Negative.     Cardiovascular: Negative.    Gastrointestinal: Negative.    Musculoskeletal: Negative.    Neurological: Negative.    All other systems reviewed and are negative.    Physical Exam  Constitutional:       General: She is not in acute distress.     Appearance: Normal appearance. She is not ill-appearing.   HENT:      Head: Normocephalic and atraumatic.   Eyes:      Extraocular Movements: Extraocular movements intact.   Cardiovascular:      Rate and Rhythm: Normal rate and regular rhythm.      Pulses: Normal pulses.      Heart sounds: Normal heart sounds. No murmur heard.  Pulmonary:      Effort: Pulmonary effort is normal. No respiratory distress.      Breath sounds: Normal breath sounds.   Abdominal:      General: Abdomen is flat. Bowel sounds are normal. There is no distension.      Palpations: Abdomen is soft.      Tenderness: There is no abdominal tenderness.   Musculoskeletal:      Cervical back: Normal range of motion.   Lymphadenopathy:      Cervical: No cervical adenopathy.    Skin:     General: Skin is warm and dry.   Neurological:      General: No focal deficit present.      Mental Status: She is alert and oriented to person, place, and time.       Pertinent Labs & Imaging:  Pathology Results  (Last 30 days)                 09/06/24 1003  Specimen to Pathology Breast (Abnormal) Final result    Narrative:  Procedure Type:->Sl Consult   Release to patient->Immediate               No results found for this or any previous visit (from the past 24 hour(s)).    Imaging:    PET 8/26/24    Impression:     In this patient with history of breast cancer status post bilateral mastectomy and axillary dissection, there is soft tissue thickening with mild FDG radiotracer uptake in the left chest wall which can represent post radiation changes or recurrence.  No prior imaging comparison.     Multiple mediastinal and hilar hypermetabolic lymphadenopathy.     Numerous hypermetabolic sclerotic osseous lesions throughout axial and appendicular skeleton, suggesting osseous metastasis.     Right upper and middle lobe pulmonary nodules largest measure 9 mm.  Recommend correlation with prior imaging.      Assessment & Plan:    1. Malignant neoplasm of upper-outer quadrant of left breast in female, estrogen receptor negative  2. 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 Secondary malignant neoplasm of mediastinal lymph node    Patient with newly diagnosed stage IV TNBC   Previously receiving Keytruda/Abraxane but review of records show PDL1 CPS <10. No indication for Keytruda based on Keynote 355  Recommend weekly abraxane 3 on/ 1 off pending 2nd opinion on pathology and outside imaging  PET CT on 8/26/24 showing interval decrease in thoracic disease burden but mention of sclerotic disease in pelvis not mentioned on outside PET report (pet and pathology still pending at time of this visit)  Breast navigation notified of need for obtained outside path and imaging  Continue Cryotherapy gloves with  treatment, reports improved symptoms  Follow up in 1 week with Dr. Anderson to discuss outside path and imaging review, if clear progression noted may consider transition to Enhertu given biopsy from 9/2022 noted HER2 1+    Pt has orders in for zometa, but it does not appear patient has started this yet.    Route Chart for Scheduling    Med Onc Chart Routing  Urgent    Follow up with physician . 9/18 or 9/19 [facility] Dr. Anderson for pet and pathology review   Follow up with LETY . 3 weeks with lety   Infusion scheduling note   3 weeks on and one week [place] [facility]   Injection scheduling note zometa injections every 4 weeks   Labs CBC and CMP   Scheduling:  Preferred lab:  Lab interval:  3 weeks on, one week off, [facility] the [facility]   Imaging    Pharmacy appointment    Other referrals                Treatment Plan Information   OP BREAST NAB-PACLITAXEL WEEKLY Renetta Anderson MD   Associated diagnosis: Malignant neoplasm of upper-outer quadrant of left breast in female, estrogen receptor negative Stage IV rcTX, cNX, pM1, G3, ER-, [place]-, HER2- noted on 7/16/2024  Associated diagnosis: 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   noted on 7/16/2024  Associated diagnosis: Secondary malignant neoplasm of mediastinal lymph node   noted on 7/16/2024   Line of treatment: First Line  Treatment Goal: Palliative     Upcoming Treatment Dates - OP BREAST NAB-PACLITAXEL WEEKLY    9/18/2024       Chemotherapy       PACLitaxeL protein-bound (ABRAXANE) 100 mg/m2 = 210 mg in 42 mL infusion  10/2/2024       Chemotherapy       PACLitaxeL protein-bound (ABRAXANE) 100 mg/m2 = 210 mg in 42 mL infusion  10/9/2024       Chemotherapy       PACLitaxeL protein-bound (ABRAXANE) 100 mg/m2 = 210 mg in 42 mL infusion  10/16/2024       Chemotherapy       PACLitaxeL protein-bound (ABRAXANE) 100 mg/m2 = 210 mg in 42 mL infusion    Supportive Plan Information  OP ZOLEDRONIC ACID (ZOMETA) Q4W Renetta Anderson MD   Associated  Diagnosis: Malignant neoplasm of upper-outer quadrant of left breast in female, estrogen receptor negative Stage IV rcTX, cNX, pM1, G3, ER-, [place]-, HER2- noted on 7/16/2024   Line of treatment: Supportive Care   Treatment goal: Palliative     Upcoming Treatment Dates - OP ZOLEDRONIC ACID (ZOMETA) Q4W    9/16/2024       Medications       zoledronic 4 mg/100 mL infusion 4 mg  10/14/2024       Medications       zoledronic 4 mg/100 mL infusion 4 mg  11/11/2024       Medications       zoledronic 4 mg/100 mL infusion 4 mg  12/9/2024       Medications       zoledronic 4 mg/100 mL infusion 4 mg    MDM includes  :    - Acute or chronic illness or injury that poses a threat to life or bodily function  - Independent review and explanation of 3+ results from unique tests  - Discussion of management and ordering 3+ unique tests  - Extensive discussion of treatment and management  - Prescription drug management  - Drug therapy requiring intensive monitoring for toxicity    Jacquelyn Apodaca NP   09/14/2024

## 2024-09-12 NOTE — Clinical Note
Do you think the outside PET will be reviewed by early this week.  It looks like path is back.  Are you able to see her on Wednesday or Thursday of this week to review?  She goes out of town Friday.  Thanks.

## 2024-09-12 NOTE — PLAN OF CARE
Pt arrived to unit, ambulatory, for chemotherapy Abraxane. Pt alert and oriented with no new or worsening complaints upon arrival. Pt denies peripheral neuropathy - uses cryotherapy on harshad upper and lower extremities. Port accessed using sterile technique - flushes well with blood return present. Abraxane administered and infused over 30 minutes as per plan - pt tolerated with no complaints or S&S of reaction. Port flushed and heparin locked. Pt discharged home upon completion in NAD.

## 2024-09-16 RX ORDER — SODIUM CHLORIDE 0.9 % (FLUSH) 0.9 %
10 SYRINGE (ML) INJECTION
OUTPATIENT
Start: 2024-09-16

## 2024-09-16 RX ORDER — HEPARIN 100 UNIT/ML
500 SYRINGE INTRAVENOUS
Status: CANCELLED | OUTPATIENT
Start: 2024-09-18

## 2024-09-16 RX ORDER — PROCHLORPERAZINE EDISYLATE 5 MG/ML
10 INJECTION INTRAMUSCULAR; INTRAVENOUS ONCE AS NEEDED
Status: CANCELLED
Start: 2024-09-18

## 2024-09-16 RX ORDER — DIPHENHYDRAMINE HYDROCHLORIDE 50 MG/ML
50 INJECTION INTRAMUSCULAR; INTRAVENOUS ONCE AS NEEDED
Status: CANCELLED | OUTPATIENT
Start: 2024-09-18

## 2024-09-16 RX ORDER — HEPARIN 100 UNIT/ML
500 SYRINGE INTRAVENOUS
OUTPATIENT
Start: 2024-09-16

## 2024-09-16 RX ORDER — SODIUM CHLORIDE 0.9 % (FLUSH) 0.9 %
10 SYRINGE (ML) INJECTION
Status: CANCELLED | OUTPATIENT
Start: 2024-09-18

## 2024-09-16 RX ORDER — EPINEPHRINE 0.3 MG/.3ML
0.3 INJECTION SUBCUTANEOUS ONCE AS NEEDED
Status: CANCELLED | OUTPATIENT
Start: 2024-09-18

## 2024-09-17 ENCOUNTER — LAB VISIT (OUTPATIENT)
Dept: LAB | Facility: HOSPITAL | Age: 59
End: 2024-09-17
Attending: INTERNAL MEDICINE
Payer: COMMERCIAL

## 2024-09-17 DIAGNOSIS — Z17.1 MALIGNANT NEOPLASM OF UPPER-OUTER QUADRANT OF LEFT BREAST IN FEMALE, ESTROGEN RECEPTOR NEGATIVE: ICD-10-CM

## 2024-09-17 DIAGNOSIS — C50.412 MALIGNANT NEOPLASM OF UPPER-OUTER QUADRANT OF LEFT BREAST IN FEMALE, ESTROGEN RECEPTOR NEGATIVE: ICD-10-CM

## 2024-09-17 DIAGNOSIS — C78.02 SECONDARY MALIGNANT NEOPLASM OF HILUS OF LEFT LUNG: ICD-10-CM

## 2024-09-17 LAB
ALBUMIN SERPL BCP-MCNC: 3.7 G/DL (ref 3.5–5.2)
ALP SERPL-CCNC: 105 U/L (ref 55–135)
ALT SERPL W/O P-5'-P-CCNC: 46 U/L (ref 10–44)
ANION GAP SERPL CALC-SCNC: 9 MMOL/L (ref 8–16)
AST SERPL-CCNC: 34 U/L (ref 10–40)
BASOPHILS # BLD AUTO: 0.02 K/UL (ref 0–0.2)
BASOPHILS NFR BLD: 0.6 % (ref 0–1.9)
BILIRUB SERPL-MCNC: 0.5 MG/DL (ref 0.1–1)
BUN SERPL-MCNC: 8 MG/DL (ref 6–20)
CALCIUM SERPL-MCNC: 9.7 MG/DL (ref 8.7–10.5)
CHLORIDE SERPL-SCNC: 99 MMOL/L (ref 95–110)
CO2 SERPL-SCNC: 29 MMOL/L (ref 23–29)
CREAT SERPL-MCNC: 0.9 MG/DL (ref 0.5–1.4)
DIFFERENTIAL METHOD BLD: ABNORMAL
EOSINOPHIL # BLD AUTO: 0 K/UL (ref 0–0.5)
EOSINOPHIL NFR BLD: 0.3 % (ref 0–8)
ERYTHROCYTE [DISTWIDTH] IN BLOOD BY AUTOMATED COUNT: 13.1 % (ref 11.5–14.5)
EST. GFR  (NO RACE VARIABLE): >60 ML/MIN/1.73 M^2
GLUCOSE SERPL-MCNC: 116 MG/DL (ref 70–110)
HCT VFR BLD AUTO: 35.3 % (ref 37–48.5)
HGB BLD-MCNC: 11.7 G/DL (ref 12–16)
IMM GRANULOCYTES # BLD AUTO: 0 K/UL (ref 0–0.04)
IMM GRANULOCYTES NFR BLD AUTO: 0 % (ref 0–0.5)
LYMPHOCYTES # BLD AUTO: 0.9 K/UL (ref 1–4.8)
LYMPHOCYTES NFR BLD: 26.3 % (ref 18–48)
MCH RBC QN AUTO: 32.3 PG (ref 27–31)
MCHC RBC AUTO-ENTMCNC: 33.1 G/DL (ref 32–36)
MCV RBC AUTO: 98 FL (ref 82–98)
MONOCYTES # BLD AUTO: 0.2 K/UL (ref 0.3–1)
MONOCYTES NFR BLD: 7.1 % (ref 4–15)
NEUTROPHILS # BLD AUTO: 2.1 K/UL (ref 1.8–7.7)
NEUTROPHILS NFR BLD: 65.7 % (ref 38–73)
NRBC BLD-RTO: 0 /100 WBC
PLATELET # BLD AUTO: 269 K/UL (ref 150–450)
PMV BLD AUTO: 8.2 FL (ref 9.2–12.9)
POTASSIUM SERPL-SCNC: 3.6 MMOL/L (ref 3.5–5.1)
PROT SERPL-MCNC: 7.2 G/DL (ref 6–8.4)
RBC # BLD AUTO: 3.62 M/UL (ref 4–5.4)
SODIUM SERPL-SCNC: 137 MMOL/L (ref 136–145)
WBC # BLD AUTO: 3.23 K/UL (ref 3.9–12.7)

## 2024-09-17 PROCEDURE — 85025 COMPLETE CBC W/AUTO DIFF WBC: CPT | Performed by: INTERNAL MEDICINE

## 2024-09-17 PROCEDURE — 80053 COMPREHEN METABOLIC PANEL: CPT | Performed by: INTERNAL MEDICINE

## 2024-09-17 PROCEDURE — 36415 COLL VENOUS BLD VENIPUNCTURE: CPT | Performed by: INTERNAL MEDICINE

## 2024-09-18 ENCOUNTER — INFUSION (OUTPATIENT)
Dept: INFUSION THERAPY | Facility: HOSPITAL | Age: 59
End: 2024-09-18
Attending: INTERNAL MEDICINE
Payer: COMMERCIAL

## 2024-09-18 VITALS
RESPIRATION RATE: 16 BRPM | OXYGEN SATURATION: 97 % | HEART RATE: 82 BPM | SYSTOLIC BLOOD PRESSURE: 136 MMHG | TEMPERATURE: 98 F | DIASTOLIC BLOOD PRESSURE: 76 MMHG

## 2024-09-18 DIAGNOSIS — C50.412 MALIGNANT NEOPLASM OF UPPER-OUTER QUADRANT OF LEFT BREAST IN FEMALE, ESTROGEN RECEPTOR NEGATIVE: Primary | ICD-10-CM

## 2024-09-18 DIAGNOSIS — Z17.1 MALIGNANT NEOPLASM OF UPPER-OUTER QUADRANT OF LEFT BREAST IN FEMALE, ESTROGEN RECEPTOR NEGATIVE: Primary | ICD-10-CM

## 2024-09-18 DIAGNOSIS — C77.1 SECONDARY MALIGNANT NEOPLASM OF MEDIASTINAL LYMPH NODE: ICD-10-CM

## 2024-09-18 DIAGNOSIS — C78.02 SECONDARY MALIGNANT NEOPLASM OF HILUS OF LEFT LUNG: ICD-10-CM

## 2024-09-18 PROCEDURE — 63600175 PHARM REV CODE 636 W HCPCS: Performed by: INTERNAL MEDICINE

## 2024-09-18 PROCEDURE — 96413 CHEMO IV INFUSION 1 HR: CPT

## 2024-09-18 PROCEDURE — 25000003 PHARM REV CODE 250: Performed by: INTERNAL MEDICINE

## 2024-09-18 RX ORDER — PROCHLORPERAZINE EDISYLATE 5 MG/ML
10 INJECTION INTRAMUSCULAR; INTRAVENOUS ONCE AS NEEDED
Status: DISCONTINUED | OUTPATIENT
Start: 2024-09-18 | End: 2024-09-18 | Stop reason: HOSPADM

## 2024-09-18 RX ORDER — HEPARIN 100 UNIT/ML
500 SYRINGE INTRAVENOUS
Status: DISCONTINUED | OUTPATIENT
Start: 2024-09-18 | End: 2024-09-18 | Stop reason: HOSPADM

## 2024-09-18 RX ORDER — SODIUM CHLORIDE 0.9 % (FLUSH) 0.9 %
10 SYRINGE (ML) INJECTION
Status: DISCONTINUED | OUTPATIENT
Start: 2024-09-18 | End: 2024-09-18 | Stop reason: HOSPADM

## 2024-09-18 RX ORDER — EPINEPHRINE 0.3 MG/.3ML
0.3 INJECTION SUBCUTANEOUS ONCE AS NEEDED
Status: DISCONTINUED | OUTPATIENT
Start: 2024-09-18 | End: 2024-09-18 | Stop reason: HOSPADM

## 2024-09-18 RX ORDER — DIPHENHYDRAMINE HYDROCHLORIDE 50 MG/ML
50 INJECTION INTRAMUSCULAR; INTRAVENOUS ONCE AS NEEDED
Status: DISCONTINUED | OUTPATIENT
Start: 2024-09-18 | End: 2024-09-18 | Stop reason: HOSPADM

## 2024-09-18 RX ADMIN — HEPARIN 500 UNITS: 100 SYRINGE at 11:09

## 2024-09-18 RX ADMIN — SODIUM CHLORIDE: 9 INJECTION, SOLUTION INTRAVENOUS at 10:09

## 2024-09-18 RX ADMIN — PACLITAXEL 200 MG: 100 INJECTION, POWDER, LYOPHILIZED, FOR SUSPENSION INTRAVENOUS at 10:09

## 2024-09-18 NOTE — NURSING
Pt tolerated Abraxane infusion well.  No signs of extravasation noted.  Site remains free of discoloration, pt denies any pain.  Pt updated on plan of care.

## 2024-09-23 DIAGNOSIS — C50.412 MALIGNANT NEOPLASM OF UPPER-OUTER QUADRANT OF LEFT BREAST IN FEMALE, ESTROGEN RECEPTOR NEGATIVE: Primary | ICD-10-CM

## 2024-09-23 DIAGNOSIS — Z17.1 MALIGNANT NEOPLASM OF UPPER-OUTER QUADRANT OF LEFT BREAST IN FEMALE, ESTROGEN RECEPTOR NEGATIVE: Primary | ICD-10-CM

## 2024-10-01 ENCOUNTER — LAB VISIT (OUTPATIENT)
Dept: LAB | Facility: HOSPITAL | Age: 59
End: 2024-10-01
Attending: INTERNAL MEDICINE
Payer: COMMERCIAL

## 2024-10-01 ENCOUNTER — TELEPHONE (OUTPATIENT)
Dept: HEMATOLOGY/ONCOLOGY | Facility: CLINIC | Age: 59
End: 2024-10-01
Payer: COMMERCIAL

## 2024-10-01 ENCOUNTER — OFFICE VISIT (OUTPATIENT)
Dept: HEMATOLOGY/ONCOLOGY | Facility: CLINIC | Age: 59
End: 2024-10-01
Payer: COMMERCIAL

## 2024-10-01 VITALS
HEART RATE: 90 BPM | OXYGEN SATURATION: 98 % | DIASTOLIC BLOOD PRESSURE: 84 MMHG | TEMPERATURE: 98 F | HEIGHT: 66 IN | RESPIRATION RATE: 18 BRPM | BODY MASS INDEX: 33.17 KG/M2 | WEIGHT: 206.38 LBS | SYSTOLIC BLOOD PRESSURE: 127 MMHG

## 2024-10-01 DIAGNOSIS — Z17.1 MALIGNANT NEOPLASM OF UPPER-OUTER QUADRANT OF LEFT BREAST IN FEMALE, ESTROGEN RECEPTOR NEGATIVE: Primary | ICD-10-CM

## 2024-10-01 DIAGNOSIS — C78.02 SECONDARY MALIGNANT NEOPLASM OF HILUS OF LEFT LUNG: ICD-10-CM

## 2024-10-01 DIAGNOSIS — Z17.1 MALIGNANT NEOPLASM OF UPPER-OUTER QUADRANT OF LEFT BREAST IN FEMALE, ESTROGEN RECEPTOR NEGATIVE: ICD-10-CM

## 2024-10-01 DIAGNOSIS — C50.412 MALIGNANT NEOPLASM OF UPPER-OUTER QUADRANT OF LEFT BREAST IN FEMALE, ESTROGEN RECEPTOR NEGATIVE: ICD-10-CM

## 2024-10-01 DIAGNOSIS — C50.412 MALIGNANT NEOPLASM OF UPPER-OUTER QUADRANT OF LEFT BREAST IN FEMALE, ESTROGEN RECEPTOR NEGATIVE: Primary | ICD-10-CM

## 2024-10-01 LAB
ALBUMIN SERPL BCP-MCNC: 3.6 G/DL (ref 3.5–5.2)
ALP SERPL-CCNC: 104 U/L (ref 55–135)
ALT SERPL W/O P-5'-P-CCNC: 40 U/L (ref 10–44)
ANION GAP SERPL CALC-SCNC: 6 MMOL/L (ref 8–16)
AST SERPL-CCNC: 31 U/L (ref 10–40)
BASOPHILS # BLD AUTO: 0.04 K/UL (ref 0–0.2)
BASOPHILS NFR BLD: 1.1 % (ref 0–1.9)
BILIRUB SERPL-MCNC: 0.4 MG/DL (ref 0.1–1)
BUN SERPL-MCNC: 8 MG/DL (ref 6–20)
CALCIUM SERPL-MCNC: 9.2 MG/DL (ref 8.7–10.5)
CHLORIDE SERPL-SCNC: 100 MMOL/L (ref 95–110)
CO2 SERPL-SCNC: 31 MMOL/L (ref 23–29)
CREAT SERPL-MCNC: 0.9 MG/DL (ref 0.5–1.4)
DIFFERENTIAL METHOD BLD: ABNORMAL
EOSINOPHIL # BLD AUTO: 0 K/UL (ref 0–0.5)
EOSINOPHIL NFR BLD: 0.8 % (ref 0–8)
ERYTHROCYTE [DISTWIDTH] IN BLOOD BY AUTOMATED COUNT: 14.3 % (ref 11.5–14.5)
EST. GFR  (NO RACE VARIABLE): >60 ML/MIN/1.73 M^2
GLUCOSE SERPL-MCNC: 107 MG/DL (ref 70–110)
HCT VFR BLD AUTO: 35.8 % (ref 37–48.5)
HGB BLD-MCNC: 12.4 G/DL (ref 12–16)
IMM GRANULOCYTES # BLD AUTO: 0.03 K/UL (ref 0–0.04)
IMM GRANULOCYTES NFR BLD AUTO: 0.8 % (ref 0–0.5)
LYMPHOCYTES # BLD AUTO: 1.1 K/UL (ref 1–4.8)
LYMPHOCYTES NFR BLD: 29.6 % (ref 18–48)
MCH RBC QN AUTO: 33.9 PG (ref 27–31)
MCHC RBC AUTO-ENTMCNC: 34.6 G/DL (ref 32–36)
MCV RBC AUTO: 98 FL (ref 82–98)
MONOCYTES # BLD AUTO: 0.7 K/UL (ref 0.3–1)
MONOCYTES NFR BLD: 17.8 % (ref 4–15)
NEUTROPHILS # BLD AUTO: 1.9 K/UL (ref 1.8–7.7)
NEUTROPHILS NFR BLD: 49.9 % (ref 38–73)
NRBC BLD-RTO: 0 /100 WBC
PLATELET # BLD AUTO: 230 K/UL (ref 150–450)
PMV BLD AUTO: 7.9 FL (ref 9.2–12.9)
POTASSIUM SERPL-SCNC: 3.6 MMOL/L (ref 3.5–5.1)
PROT SERPL-MCNC: 7 G/DL (ref 6–8.4)
RBC # BLD AUTO: 3.66 M/UL (ref 4–5.4)
SODIUM SERPL-SCNC: 137 MMOL/L (ref 136–145)
WBC # BLD AUTO: 3.71 K/UL (ref 3.9–12.7)

## 2024-10-01 PROCEDURE — 3008F BODY MASS INDEX DOCD: CPT | Mod: CPTII,S$GLB,, | Performed by: INTERNAL MEDICINE

## 2024-10-01 PROCEDURE — 99999 PR PBB SHADOW E&M-EST. PATIENT-LVL IV: CPT | Mod: PBBFAC,,, | Performed by: INTERNAL MEDICINE

## 2024-10-01 PROCEDURE — 85025 COMPLETE CBC W/AUTO DIFF WBC: CPT | Performed by: INTERNAL MEDICINE

## 2024-10-01 PROCEDURE — 36415 COLL VENOUS BLD VENIPUNCTURE: CPT | Performed by: INTERNAL MEDICINE

## 2024-10-01 PROCEDURE — 99215 OFFICE O/P EST HI 40 MIN: CPT | Mod: S$GLB,,, | Performed by: INTERNAL MEDICINE

## 2024-10-01 PROCEDURE — 80053 COMPREHEN METABOLIC PANEL: CPT | Performed by: INTERNAL MEDICINE

## 2024-10-01 PROCEDURE — 1159F MED LIST DOCD IN RCRD: CPT | Mod: CPTII,S$GLB,, | Performed by: INTERNAL MEDICINE

## 2024-10-01 PROCEDURE — G2211 COMPLEX E/M VISIT ADD ON: HCPCS | Mod: S$GLB,,, | Performed by: INTERNAL MEDICINE

## 2024-10-01 PROCEDURE — 3074F SYST BP LT 130 MM HG: CPT | Mod: CPTII,S$GLB,, | Performed by: INTERNAL MEDICINE

## 2024-10-01 PROCEDURE — 3079F DIAST BP 80-89 MM HG: CPT | Mod: CPTII,S$GLB,, | Performed by: INTERNAL MEDICINE

## 2024-10-01 RX ORDER — HEPARIN 100 UNIT/ML
500 SYRINGE INTRAVENOUS
Status: CANCELLED | OUTPATIENT
Start: 2024-10-02

## 2024-10-01 RX ORDER — HEPARIN 100 UNIT/ML
500 SYRINGE INTRAVENOUS
OUTPATIENT
Start: 2024-10-09

## 2024-10-01 RX ORDER — DIPHENHYDRAMINE HYDROCHLORIDE 50 MG/ML
50 INJECTION INTRAMUSCULAR; INTRAVENOUS ONCE AS NEEDED
OUTPATIENT
Start: 2024-10-16

## 2024-10-01 RX ORDER — PROCHLORPERAZINE EDISYLATE 5 MG/ML
10 INJECTION INTRAMUSCULAR; INTRAVENOUS ONCE AS NEEDED
Start: 2024-10-16

## 2024-10-01 RX ORDER — SODIUM CHLORIDE 0.9 % (FLUSH) 0.9 %
10 SYRINGE (ML) INJECTION
OUTPATIENT
Start: 2024-10-09

## 2024-10-01 RX ORDER — DIPHENHYDRAMINE HYDROCHLORIDE 50 MG/ML
50 INJECTION INTRAMUSCULAR; INTRAVENOUS ONCE AS NEEDED
OUTPATIENT
Start: 2024-10-09

## 2024-10-01 RX ORDER — HEPARIN 100 UNIT/ML
500 SYRINGE INTRAVENOUS
OUTPATIENT
Start: 2024-10-16

## 2024-10-01 RX ORDER — EPINEPHRINE 0.3 MG/.3ML
0.3 INJECTION SUBCUTANEOUS ONCE AS NEEDED
OUTPATIENT
Start: 2024-10-16

## 2024-10-01 RX ORDER — PROCHLORPERAZINE EDISYLATE 5 MG/ML
10 INJECTION INTRAMUSCULAR; INTRAVENOUS ONCE AS NEEDED
Status: CANCELLED
Start: 2024-10-02

## 2024-10-01 RX ORDER — EPINEPHRINE 0.3 MG/.3ML
0.3 INJECTION SUBCUTANEOUS ONCE AS NEEDED
Status: CANCELLED | OUTPATIENT
Start: 2024-10-02

## 2024-10-01 RX ORDER — DIPHENHYDRAMINE HYDROCHLORIDE 50 MG/ML
50 INJECTION INTRAMUSCULAR; INTRAVENOUS ONCE AS NEEDED
Status: CANCELLED | OUTPATIENT
Start: 2024-10-02

## 2024-10-01 RX ORDER — SODIUM CHLORIDE 0.9 % (FLUSH) 0.9 %
10 SYRINGE (ML) INJECTION
Status: CANCELLED | OUTPATIENT
Start: 2024-10-02

## 2024-10-01 RX ORDER — SODIUM CHLORIDE 0.9 % (FLUSH) 0.9 %
10 SYRINGE (ML) INJECTION
OUTPATIENT
Start: 2024-10-16

## 2024-10-01 RX ORDER — EPINEPHRINE 0.3 MG/.3ML
0.3 INJECTION SUBCUTANEOUS ONCE AS NEEDED
OUTPATIENT
Start: 2024-10-09

## 2024-10-01 RX ORDER — PROCHLORPERAZINE EDISYLATE 5 MG/ML
10 INJECTION INTRAMUSCULAR; INTRAVENOUS ONCE AS NEEDED
Start: 2024-10-09

## 2024-10-01 NOTE — PROGRESS NOTES
"  [facility]/ [facility]   at [facility] Note:      Chief Complaint:   Encounter Diagnoses   Name Primary?    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 female who presents today for follow up for stage IV TNBC. She presents today for C4D1 Abraxane. Outside imaging was located and reviewed. Per radiology, bony disease was present on outside imaging.   Requested pathology review and PDL testing     Oncology History   Patient with TNBC diagnosed in 2022 in [place], [place]   Routine screening ultrasound demonstrated 11mm mass in the L breast   Biopsy 9/30/22: IDC, grade 3; ER-/IA-/HER2 0; PDL1 CPS <10    NACT with weekly carboplatin/paclitaxel x 12  Bilateral mastectomy March 2022: residual IDC, 1.7mm; 0 LN+  Adjuvant AC x 4 completed 5/23/23    Routine PET imaging without new disease  Follow up June 2024 demonstrated "elevated tumor markers" with high  and LDH  PET 6/11/24: bilateral hilar and mediastinal LAD, 2.7cm KRIS mass  Bronchoscopy 6/18/24: metastatic carcinoma 4R/11L c/w breast carcinoma     Started Keytruda/Abraxane 6/20/24, dropped Keytruda given no PDL1      Patient Active Problem List   Diagnosis    Malignant neoplasm of upper-outer quadrant of left breast in female, estrogen receptor negative    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        Mild fatigue, able to do adls   HENT: Negative.     Respiratory: Negative.     Cardiovascular: Negative.    Gastrointestinal: Negative.    Musculoskeletal: Negative.    Neurological: Negative.    All other systems reviewed and are negative.    Physical Exam  Constitutional:       General: She is not in acute distress.     Appearance: Normal appearance. She is not ill-appearing.   HENT:      Head: Normocephalic and atraumatic.   Eyes:      Extraocular Movements: Extraocular movements intact.   Cardiovascular:      Rate and Rhythm: Normal rate and regular rhythm.      Pulses: Normal pulses.      Heart sounds: Normal heart sounds. No murmur heard.  Pulmonary:      Effort: Pulmonary effort is normal. No respiratory distress.      Breath sounds: Normal breath sounds.   Abdominal:      General: Abdomen is flat. Bowel sounds are normal. There is no distension.      Palpations: Abdomen is soft.      Tenderness: There is no abdominal tenderness.   Musculoskeletal:      Cervical back: Normal range of motion.   Lymphadenopathy:      Cervical: No cervical adenopathy.   Skin:     General: Skin is warm and dry.   Neurological:      General: No focal deficit present.      Mental Status: She is alert and oriented to person, place, and time.       Pertinent Labs & Imaging:  Pathology Results  (Last 30 days)                 09/06/24 1003  Specimen to Pathology Breast (Abnormal) Final result    Narrative:  Procedure Type:->Sl Consult   Release to patient->Immediate               Recent Results (from the past 24 hours)   CBC W/ AUTO DIFFERENTIAL    Collection Time: 10/01/24 10:10 AM   Result Value  Ref Range    WBC 3.71 (L) 3.90 - 12.70 K/uL    RBC 3.66 (L) 4.00 - 5.40 M/uL    Hemoglobin 12.4 12.0 - 16.0 g/dL    Hematocrit 35.8 (L) 37.0 - 48.5 %    MCV 98 82 - 98 fL    MCH 33.9 (H) 27.0 - 31.0 pg    MCHC 34.6 32.0 - 36.0 g/dL    RDW 14.3 11.5 - 14.5 %    Platelets 230 150 - 450 K/uL    MPV 7.9 (L) 9.2 - 12.9 fL    Immature Granulocytes 0.8 (H) 0.0 - 0.5 %    Gran # (ANC) 1.9 1.8 - 7.7 K/uL    Immature Grans (Abs) 0.03 0.00 - 0.04 K/uL    Lymph # 1.1 1.0 - 4.8 K/uL    Mono # 0.7 0.3 - 1.0 K/uL    Eos # 0.0 0.0 - 0.5 K/uL    Baso # 0.04 0.00 - 0.20 K/uL    nRBC 0 0 /100 WBC    Gran % 49.9 38.0 - 73.0 %    Lymph % 29.6 18.0 - 48.0 %    Mono % 17.8 (H) 4.0 - 15.0 %    Eosinophil % 0.8 0.0 - 8.0 %    Basophil % 1.1 0.0 - 1.9 %    Differential Method Automated    CMP    Collection Time: 10/01/24 10:10 AM   Result Value Ref Range    Sodium 137 136 - 145 mmol/L    Potassium 3.6 3.5 - 5.1 mmol/L    Chloride 100 95 - 110 mmol/L    CO2 31 (H) 23 - 29 mmol/L    Glucose 107 70 - 110 mg/dL    BUN 8 6 - 20 mg/dL    Creatinine 0.9 0.5 - 1.4 mg/dL    Calcium 9.2 8.7 - 10.5 mg/dL    Total Protein 7.0 6.0 - 8.4 g/dL    Albumin 3.6 3.5 - 5.2 g/dL    Total Bilirubin 0.4 0.1 - 1.0 mg/dL    Alkaline Phosphatase 104 55 - 135 U/L    AST 31 10 - 40 U/L    ALT 40 10 - 44 U/L    eGFR >60 >60 mL/min/1.73 m^2    Anion Gap 6 (L) 8 - 16 mmol/L       Imaging:    PET 8/26/24    Impression:     In this patient with history of breast cancer status post bilateral mastectomy and axillary dissection, there is soft tissue thickening with mild FDG radiotracer uptake in the left chest wall which can represent post radiation changes or recurrence.  No prior imaging comparison.     Multiple mediastinal and hilar hypermetabolic lymphadenopathy.     Numerous hypermetabolic sclerotic osseous lesions throughout axial and appendicular skeleton, suggesting osseous metastasis.     Right upper and middle lobe pulmonary nodules largest measure 9 mm.  Recommend  correlation with prior imaging.      Assessment & Plan:    1. Malignant neoplasm of upper-outer quadrant of left breast in female, estrogen receptor negative  2. 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 Secondary malignant neoplasm of mediastinal lymph node    Patient with stage IV TNBC on abraxane  Previously receiving Keytruda/Abraxane but review of records show PDL1 CPS <10. No indication for Keytruda based on Keynote 355  Recommend weekly abraxane 3 on/ 1 off pending 2nd opinion on pathology and outside imaging  PET CT on 8/26/24 showing interval decrease in thoracic disease burden but mention of sclerotic disease in pelvis not mentioned on outside PET report. Review of outside PET showed bony disease present, likely underread at outside location. Will plan short interval repeat so as to establish a better baseline vs early detection of progression  Outside PDL testing pending   Continue Cryotherapy gloves with treatment, reports improved symptoms  Patient prefers to follow up 2 weeks with Jacquelyn; 4 weeks with PET       Route Chart for Scheduling    Med Onc Chart Routing      Follow up with physician 4 weeks. with PET and labs   Follow up with LINDA 2 weeks. 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   noted on 7/16/2024  Associated diagnosis: Secondary malignant neoplasm of mediastinal lymph node   noted on 7/16/2024   Line of treatment: First Line  Treatment  Goal: Palliative     Upcoming Treatment Dates - OP BREAST NAB-PACLITAXEL WEEKLY    10/2/2024       Chemotherapy       PACLitaxeL protein-bound (ABRAXANE) 100 mg/m2 = 210 mg in 42 mL infusion  10/9/2024       Chemotherapy       PACLitaxeL protein-bound (ABRAXANE) 100 mg/m2 = 210 mg in 42 mL infusion  10/16/2024       Chemotherapy       PACLitaxeL protein-bound (ABRAXANE) 100 mg/m2 = 210 mg in 42 mL infusion  10/30/2024       Chemotherapy       PACLitaxeL protein-bound (ABRAXANE) 100 mg/m2 = 210 mg in 42 mL infusion    Supportive Plan Information  OP ZOLEDRONIC ACID (ZOMETA) Q4W Renetta Anderson MD   Associated Diagnosis: Malignant neoplasm of upper-outer quadrant of left breast in female, estrogen receptor negative Stage IV rcTX, cNX, pM1, G3, ER-, [place]-, HER2- noted on 7/16/2024   Line of treatment: Supportive Care   Treatment goal: Palliative     Upcoming Treatment Dates - OP ZOLEDRONIC ACID (ZOMETA) Q4W    9/16/2024       Medications       zoledronic acid (ZOMETA) 4 mg in 0.9% NaCl 100 mL IVPB  10/14/2024       Medications       zoledronic acid (ZOMETA) 4 mg in 0.9% NaCl 100 mL IVPB  11/11/2024       Medications       zoledronic acid (ZOMETA) 4 mg in 0.9% NaCl 100 mL IVPB  12/9/2024       Medications       zoledronic acid (ZOMETA) 4 mg in 0.9% NaCl 100 mL IVPB    MDM includes  :    - Acute or chronic illness or injury that poses a threat to life or bodily function  - Independent review and explanation of 3+ results from unique tests  - Discussion of management and ordering 3+ unique tests  - Extensive discussion of treatment and management  - Prescription drug management  - Drug therapy requiring intensive monitoring for toxicity    Renetta Anderson MD   10/01/2024

## 2024-10-01 NOTE — TELEPHONE ENCOUNTER
----- Message from Sosa sent at 10/1/2024  3:55 PM CDT -----  Regarding: Consult/Advisory  Contact: pt @ [phone]  Consult/Advisory     Name Of Caller: Jacki Stone     Contact Preference:[phone]     Nature of call: pt is calling to get appt changed to 10/14 or anything during the week along with other appts. Asking for a call back

## 2024-10-02 ENCOUNTER — INFUSION (OUTPATIENT)
Dept: INFUSION THERAPY | Facility: HOSPITAL | Age: 59
End: 2024-10-02
Attending: INTERNAL MEDICINE
Payer: COMMERCIAL

## 2024-10-02 ENCOUNTER — DOCUMENTATION ONLY (OUTPATIENT)
Dept: HEMATOLOGY/ONCOLOGY | Facility: CLINIC | Age: 59
End: 2024-10-02
Payer: COMMERCIAL

## 2024-10-02 VITALS
RESPIRATION RATE: 18 BRPM | TEMPERATURE: 98 F | HEART RATE: 83 BPM | OXYGEN SATURATION: 99 % | DIASTOLIC BLOOD PRESSURE: 65 MMHG | SYSTOLIC BLOOD PRESSURE: 121 MMHG

## 2024-10-02 DIAGNOSIS — C50.412 MALIGNANT NEOPLASM OF UPPER-OUTER QUADRANT OF LEFT BREAST IN FEMALE, ESTROGEN RECEPTOR NEGATIVE: Primary | ICD-10-CM

## 2024-10-02 DIAGNOSIS — C78.02 SECONDARY MALIGNANT NEOPLASM OF HILUS OF LEFT LUNG: ICD-10-CM

## 2024-10-02 DIAGNOSIS — Z17.1 MALIGNANT NEOPLASM OF UPPER-OUTER QUADRANT OF LEFT BREAST IN FEMALE, ESTROGEN RECEPTOR NEGATIVE: Primary | ICD-10-CM

## 2024-10-02 DIAGNOSIS — C77.1 SECONDARY MALIGNANT NEOPLASM OF MEDIASTINAL LYMPH NODE: ICD-10-CM

## 2024-10-02 PROCEDURE — 96413 CHEMO IV INFUSION 1 HR: CPT

## 2024-10-02 PROCEDURE — 63600175 PHARM REV CODE 636 W HCPCS: Mod: JZ,JG | Performed by: INTERNAL MEDICINE

## 2024-10-02 PROCEDURE — 25000003 PHARM REV CODE 250: Performed by: INTERNAL MEDICINE

## 2024-10-02 RX ORDER — SODIUM CHLORIDE 0.9 % (FLUSH) 0.9 %
10 SYRINGE (ML) INJECTION
Status: DISCONTINUED | OUTPATIENT
Start: 2024-10-02 | End: 2024-10-02 | Stop reason: HOSPADM

## 2024-10-02 RX ORDER — PROCHLORPERAZINE EDISYLATE 5 MG/ML
10 INJECTION INTRAMUSCULAR; INTRAVENOUS ONCE AS NEEDED
Status: DISCONTINUED | OUTPATIENT
Start: 2024-10-02 | End: 2024-10-02 | Stop reason: HOSPADM

## 2024-10-02 RX ORDER — DIPHENHYDRAMINE HYDROCHLORIDE 50 MG/ML
50 INJECTION INTRAMUSCULAR; INTRAVENOUS ONCE AS NEEDED
Status: DISCONTINUED | OUTPATIENT
Start: 2024-10-02 | End: 2024-10-02 | Stop reason: HOSPADM

## 2024-10-02 RX ORDER — EPINEPHRINE 0.3 MG/.3ML
0.3 INJECTION SUBCUTANEOUS ONCE AS NEEDED
Status: DISCONTINUED | OUTPATIENT
Start: 2024-10-02 | End: 2024-10-02 | Stop reason: HOSPADM

## 2024-10-02 RX ORDER — HEPARIN 100 UNIT/ML
500 SYRINGE INTRAVENOUS
Status: DISCONTINUED | OUTPATIENT
Start: 2024-10-02 | End: 2024-10-02 | Stop reason: HOSPADM

## 2024-10-02 RX ADMIN — HEPARIN 500 UNITS: 100 SYRINGE at 12:10

## 2024-10-02 RX ADMIN — PACLITAXEL 200 MG: 100 INJECTION, POWDER, LYOPHILIZED, FOR SUSPENSION INTRAVENOUS at 11:10

## 2024-10-02 RX ADMIN — SODIUM CHLORIDE: 9 INJECTION, SOLUTION INTRAVENOUS at 11:10

## 2024-10-02 NOTE — PROGRESS NOTES
SW received referral to contact the patient regarding Breast Cancer Support Group. SW spoke with the patient and provided the patient with information regarding the support group and also emailed the patient the Breast Cancer Support Group flyer. SW answered and addressed all questions and concerns. SW provided patient with SW contact information should she have any further questions or concerns. SW continues to be available as needed.     ELISHA Rockwell, [facility]  Oncology Social Worker   [facility] - Oncology  [phone]

## 2024-10-08 ENCOUNTER — LAB VISIT (OUTPATIENT)
Dept: LAB | Facility: HOSPITAL | Age: 59
End: 2024-10-08
Attending: INTERNAL MEDICINE
Payer: COMMERCIAL

## 2024-10-08 DIAGNOSIS — C78.02 SECONDARY MALIGNANT NEOPLASM OF HILUS OF LEFT LUNG: ICD-10-CM

## 2024-10-08 DIAGNOSIS — Z17.1 MALIGNANT NEOPLASM OF UPPER-OUTER QUADRANT OF LEFT BREAST IN FEMALE, ESTROGEN RECEPTOR NEGATIVE: ICD-10-CM

## 2024-10-08 DIAGNOSIS — C50.412 MALIGNANT NEOPLASM OF UPPER-OUTER QUADRANT OF LEFT BREAST IN FEMALE, ESTROGEN RECEPTOR NEGATIVE: ICD-10-CM

## 2024-10-08 LAB
ALBUMIN SERPL BCP-MCNC: 3.8 G/DL (ref 3.5–5.2)
ALP SERPL-CCNC: 96 U/L (ref 55–135)
ALT SERPL W/O P-5'-P-CCNC: 35 U/L (ref 10–44)
ANION GAP SERPL CALC-SCNC: 8 MMOL/L (ref 8–16)
AST SERPL-CCNC: 28 U/L (ref 10–40)
BASOPHILS # BLD AUTO: 0.03 K/UL (ref 0–0.2)
BASOPHILS NFR BLD: 0.9 % (ref 0–1.9)
BILIRUB SERPL-MCNC: 0.5 MG/DL (ref 0.1–1)
BUN SERPL-MCNC: 12 MG/DL (ref 6–20)
CALCIUM SERPL-MCNC: 9.7 MG/DL (ref 8.7–10.5)
CHLORIDE SERPL-SCNC: 97 MMOL/L (ref 95–110)
CO2 SERPL-SCNC: 30 MMOL/L (ref 23–29)
CREAT SERPL-MCNC: 0.9 MG/DL (ref 0.5–1.4)
DIFFERENTIAL METHOD BLD: ABNORMAL
EOSINOPHIL # BLD AUTO: 0 K/UL (ref 0–0.5)
EOSINOPHIL NFR BLD: 0 % (ref 0–8)
ERYTHROCYTE [DISTWIDTH] IN BLOOD BY AUTOMATED COUNT: 13.4 % (ref 11.5–14.5)
EST. GFR  (NO RACE VARIABLE): >60 ML/MIN/1.73 M^2
GLUCOSE SERPL-MCNC: 113 MG/DL (ref 70–110)
HCT VFR BLD AUTO: 35.4 % (ref 37–48.5)
HGB BLD-MCNC: 12.3 G/DL (ref 12–16)
IMM GRANULOCYTES # BLD AUTO: 0.02 K/UL (ref 0–0.04)
IMM GRANULOCYTES NFR BLD AUTO: 0.6 % (ref 0–0.5)
LYMPHOCYTES # BLD AUTO: 0.9 K/UL (ref 1–4.8)
LYMPHOCYTES NFR BLD: 28 % (ref 18–48)
MCH RBC QN AUTO: 34 PG (ref 27–31)
MCHC RBC AUTO-ENTMCNC: 34.7 G/DL (ref 32–36)
MCV RBC AUTO: 98 FL (ref 82–98)
MONOCYTES # BLD AUTO: 0.4 K/UL (ref 0.3–1)
MONOCYTES NFR BLD: 10.9 % (ref 4–15)
NEUTROPHILS # BLD AUTO: 2 K/UL (ref 1.8–7.7)
NEUTROPHILS NFR BLD: 59.6 % (ref 38–73)
NRBC BLD-RTO: 0 /100 WBC
PLATELET # BLD AUTO: 231 K/UL (ref 150–450)
PMV BLD AUTO: 8 FL (ref 9.2–12.9)
POTASSIUM SERPL-SCNC: 3.7 MMOL/L (ref 3.5–5.1)
PROT SERPL-MCNC: 7.4 G/DL (ref 6–8.4)
RBC # BLD AUTO: 3.62 M/UL (ref 4–5.4)
SODIUM SERPL-SCNC: 135 MMOL/L (ref 136–145)
WBC # BLD AUTO: 3.29 K/UL (ref 3.9–12.7)

## 2024-10-08 PROCEDURE — 85025 COMPLETE CBC W/AUTO DIFF WBC: CPT | Performed by: INTERNAL MEDICINE

## 2024-10-08 PROCEDURE — 36415 COLL VENOUS BLD VENIPUNCTURE: CPT | Performed by: INTERNAL MEDICINE

## 2024-10-08 PROCEDURE — 80053 COMPREHEN METABOLIC PANEL: CPT | Performed by: INTERNAL MEDICINE

## 2024-10-09 ENCOUNTER — INFUSION (OUTPATIENT)
Dept: INFUSION THERAPY | Facility: HOSPITAL | Age: 59
End: 2024-10-09
Attending: INTERNAL MEDICINE
Payer: COMMERCIAL

## 2024-10-09 VITALS
SYSTOLIC BLOOD PRESSURE: 122 MMHG | RESPIRATION RATE: 18 BRPM | DIASTOLIC BLOOD PRESSURE: 70 MMHG | BODY MASS INDEX: 33.06 KG/M2 | HEART RATE: 75 BPM | WEIGHT: 204.81 LBS | OXYGEN SATURATION: 98 % | TEMPERATURE: 98 F

## 2024-10-09 DIAGNOSIS — C50.412 MALIGNANT NEOPLASM OF UPPER-OUTER QUADRANT OF LEFT BREAST IN FEMALE, ESTROGEN RECEPTOR NEGATIVE: Primary | ICD-10-CM

## 2024-10-09 DIAGNOSIS — Z17.1 MALIGNANT NEOPLASM OF UPPER-OUTER QUADRANT OF LEFT BREAST IN FEMALE, ESTROGEN RECEPTOR NEGATIVE: Primary | ICD-10-CM

## 2024-10-09 DIAGNOSIS — C78.02 SECONDARY MALIGNANT NEOPLASM OF HILUS OF LEFT LUNG: ICD-10-CM

## 2024-10-09 DIAGNOSIS — C77.1 SECONDARY MALIGNANT NEOPLASM OF MEDIASTINAL LYMPH NODE: ICD-10-CM

## 2024-10-09 PROCEDURE — 63600175 PHARM REV CODE 636 W HCPCS: Performed by: INTERNAL MEDICINE

## 2024-10-09 PROCEDURE — 96413 CHEMO IV INFUSION 1 HR: CPT

## 2024-10-09 RX ORDER — HEPARIN 100 UNIT/ML
500 SYRINGE INTRAVENOUS
Status: DISCONTINUED | OUTPATIENT
Start: 2024-10-09 | End: 2024-10-09 | Stop reason: HOSPADM

## 2024-10-09 RX ADMIN — HEPARIN 500 UNITS: 100 SYRINGE at 11:10

## 2024-10-09 RX ADMIN — PACLITAXEL 200 MG: 100 INJECTION, POWDER, LYOPHILIZED, FOR SUSPENSION INTRAVENOUS at 11:10

## 2024-10-09 NOTE — PLAN OF CARE
Pt arrived to unit, ambulatory, for chemotherapy Abraxane. Pt alert and oriented with no new or worsening complaints upon arrival. Pt denies peripheral neuropathy or any other side effects of treatment at this time. Port accessed using sterile technique - flushes well with blood return present. Cryotherapy mittens and socks in place. Abraxane administered and infused over 30 minutes - pt tolerated with no complaints or S&S of reaction and VSS. Port flushed and heparin locked. Pt discharged home upon completion in NAD.

## 2024-10-15 ENCOUNTER — LAB VISIT (OUTPATIENT)
Dept: LAB | Facility: HOSPITAL | Age: 59
End: 2024-10-15
Attending: INTERNAL MEDICINE
Payer: COMMERCIAL

## 2024-10-15 DIAGNOSIS — C50.412 MALIGNANT NEOPLASM OF UPPER-OUTER QUADRANT OF LEFT BREAST IN FEMALE, ESTROGEN RECEPTOR NEGATIVE: ICD-10-CM

## 2024-10-15 DIAGNOSIS — C78.02 SECONDARY MALIGNANT NEOPLASM OF HILUS OF LEFT LUNG: ICD-10-CM

## 2024-10-15 DIAGNOSIS — Z17.1 MALIGNANT NEOPLASM OF UPPER-OUTER QUADRANT OF LEFT BREAST IN FEMALE, ESTROGEN RECEPTOR NEGATIVE: ICD-10-CM

## 2024-10-15 LAB
ALBUMIN SERPL BCP-MCNC: 3.6 G/DL (ref 3.5–5.2)
ALP SERPL-CCNC: 97 U/L (ref 55–135)
ALT SERPL W/O P-5'-P-CCNC: 47 U/L (ref 10–44)
ANION GAP SERPL CALC-SCNC: 10 MMOL/L (ref 8–16)
AST SERPL-CCNC: 35 U/L (ref 10–40)
BASOPHILS # BLD AUTO: 0.04 K/UL (ref 0–0.2)
BASOPHILS NFR BLD: 1.4 % (ref 0–1.9)
BILIRUB SERPL-MCNC: 0.5 MG/DL (ref 0.1–1)
BUN SERPL-MCNC: 9 MG/DL (ref 6–20)
CALCIUM SERPL-MCNC: 9.1 MG/DL (ref 8.7–10.5)
CHLORIDE SERPL-SCNC: 100 MMOL/L (ref 95–110)
CO2 SERPL-SCNC: 26 MMOL/L (ref 23–29)
CREAT SERPL-MCNC: 0.9 MG/DL (ref 0.5–1.4)
DIFFERENTIAL METHOD BLD: ABNORMAL
EOSINOPHIL # BLD AUTO: 0 K/UL (ref 0–0.5)
EOSINOPHIL NFR BLD: 0 % (ref 0–8)
ERYTHROCYTE [DISTWIDTH] IN BLOOD BY AUTOMATED COUNT: 13.5 % (ref 11.5–14.5)
EST. GFR  (NO RACE VARIABLE): >60 ML/MIN/1.73 M^2
GLUCOSE SERPL-MCNC: 126 MG/DL (ref 70–110)
HCT VFR BLD AUTO: 32.4 % (ref 37–48.5)
HGB BLD-MCNC: 11.8 G/DL (ref 12–16)
IMM GRANULOCYTES # BLD AUTO: 0.02 K/UL (ref 0–0.04)
IMM GRANULOCYTES NFR BLD AUTO: 0.7 % (ref 0–0.5)
LYMPHOCYTES # BLD AUTO: 0.9 K/UL (ref 1–4.8)
LYMPHOCYTES NFR BLD: 33.1 % (ref 18–48)
MCH RBC QN AUTO: 34.2 PG (ref 27–31)
MCHC RBC AUTO-ENTMCNC: 36.4 G/DL (ref 32–36)
MCV RBC AUTO: 94 FL (ref 82–98)
MONOCYTES # BLD AUTO: 0.3 K/UL (ref 0.3–1)
MONOCYTES NFR BLD: 12 % (ref 4–15)
NEUTROPHILS # BLD AUTO: 1.5 K/UL (ref 1.8–7.7)
NEUTROPHILS NFR BLD: 52.8 % (ref 38–73)
NRBC BLD-RTO: 0 /100 WBC
PLATELET # BLD AUTO: 231 K/UL (ref 150–450)
PMV BLD AUTO: 7.9 FL (ref 9.2–12.9)
POTASSIUM SERPL-SCNC: 3.2 MMOL/L (ref 3.5–5.1)
PROT SERPL-MCNC: 7 G/DL (ref 6–8.4)
RBC # BLD AUTO: 3.45 M/UL (ref 4–5.4)
SODIUM SERPL-SCNC: 136 MMOL/L (ref 136–145)
WBC # BLD AUTO: 2.84 K/UL (ref 3.9–12.7)

## 2024-10-15 PROCEDURE — 85025 COMPLETE CBC W/AUTO DIFF WBC: CPT | Performed by: INTERNAL MEDICINE

## 2024-10-15 PROCEDURE — 36415 COLL VENOUS BLD VENIPUNCTURE: CPT | Performed by: INTERNAL MEDICINE

## 2024-10-15 PROCEDURE — 80053 COMPREHEN METABOLIC PANEL: CPT | Performed by: INTERNAL MEDICINE

## 2024-10-15 RX ORDER — SODIUM CHLORIDE 0.9 % (FLUSH) 0.9 %
10 SYRINGE (ML) INJECTION
OUTPATIENT
Start: 2024-10-15

## 2024-10-15 RX ORDER — HEPARIN 100 UNIT/ML
500 SYRINGE INTRAVENOUS
OUTPATIENT
Start: 2024-10-15

## 2024-10-16 ENCOUNTER — INFUSION (OUTPATIENT)
Dept: INFUSION THERAPY | Facility: HOSPITAL | Age: 59
End: 2024-10-16
Attending: INTERNAL MEDICINE
Payer: COMMERCIAL

## 2024-10-16 VITALS
TEMPERATURE: 98 F | WEIGHT: 206.38 LBS | SYSTOLIC BLOOD PRESSURE: 123 MMHG | HEART RATE: 84 BPM | DIASTOLIC BLOOD PRESSURE: 61 MMHG | RESPIRATION RATE: 16 BRPM | OXYGEN SATURATION: 99 % | BODY MASS INDEX: 33.31 KG/M2

## 2024-10-16 DIAGNOSIS — C77.1 SECONDARY MALIGNANT NEOPLASM OF MEDIASTINAL LYMPH NODE: ICD-10-CM

## 2024-10-16 DIAGNOSIS — Z17.1 MALIGNANT NEOPLASM OF UPPER-OUTER QUADRANT OF LEFT BREAST IN FEMALE, ESTROGEN RECEPTOR NEGATIVE: Primary | ICD-10-CM

## 2024-10-16 DIAGNOSIS — C78.02 SECONDARY MALIGNANT NEOPLASM OF HILUS OF LEFT LUNG: ICD-10-CM

## 2024-10-16 DIAGNOSIS — C50.412 MALIGNANT NEOPLASM OF UPPER-OUTER QUADRANT OF LEFT BREAST IN FEMALE, ESTROGEN RECEPTOR NEGATIVE: Primary | ICD-10-CM

## 2024-10-16 PROCEDURE — 96413 CHEMO IV INFUSION 1 HR: CPT

## 2024-10-16 PROCEDURE — 63600175 PHARM REV CODE 636 W HCPCS: Performed by: INTERNAL MEDICINE

## 2024-10-16 RX ORDER — HEPARIN 100 UNIT/ML
500 SYRINGE INTRAVENOUS
Status: DISCONTINUED | OUTPATIENT
Start: 2024-10-16 | End: 2024-10-16 | Stop reason: HOSPADM

## 2024-10-16 RX ADMIN — HEPARIN 500 UNITS: 100 SYRINGE at 12:10

## 2024-10-16 RX ADMIN — PACLITAXEL 200 MG: 100 INJECTION, POWDER, LYOPHILIZED, FOR SUSPENSION INTRAVENOUS at 11:10

## 2024-10-16 NOTE — PLAN OF CARE
PT arrived to unit alert and oriented. Pt states no concerns or complaints at this time. Pt port accessed using sterile technique. Pt received infusion over 30 minutes with no S&S of complications. VSS. Pt discharged off unit in [employer].

## 2024-10-17 ENCOUNTER — OFFICE VISIT (OUTPATIENT)
Dept: HEMATOLOGY/ONCOLOGY | Facility: CLINIC | Age: 59
End: 2024-10-17
Payer: COMMERCIAL

## 2024-10-17 VITALS
HEART RATE: 92 BPM | DIASTOLIC BLOOD PRESSURE: 75 MMHG | HEIGHT: 66 IN | TEMPERATURE: 97 F | OXYGEN SATURATION: 99 % | BODY MASS INDEX: 32.81 KG/M2 | SYSTOLIC BLOOD PRESSURE: 127 MMHG | WEIGHT: 204.13 LBS

## 2024-10-17 DIAGNOSIS — C79.51 CARCINOMA OF BREAST METASTATIC TO BONE, UNSPECIFIED LATERALITY: ICD-10-CM

## 2024-10-17 DIAGNOSIS — Z90.13 H/O BILATERAL MASTECTOMY: ICD-10-CM

## 2024-10-17 DIAGNOSIS — C78.02 SECONDARY MALIGNANT NEOPLASM OF HILUS OF LEFT LUNG: ICD-10-CM

## 2024-10-17 DIAGNOSIS — R73.09 ELEVATED GLUCOSE: ICD-10-CM

## 2024-10-17 DIAGNOSIS — C50.919 CARCINOMA OF BREAST METASTATIC TO BONE, UNSPECIFIED LATERALITY: ICD-10-CM

## 2024-10-17 DIAGNOSIS — Z17.1 MALIGNANT NEOPLASM OF UPPER-OUTER QUADRANT OF LEFT BREAST IN FEMALE, ESTROGEN RECEPTOR NEGATIVE: Primary | ICD-10-CM

## 2024-10-17 DIAGNOSIS — C50.412 MALIGNANT NEOPLASM OF UPPER-OUTER QUADRANT OF LEFT BREAST IN FEMALE, ESTROGEN RECEPTOR NEGATIVE: Primary | ICD-10-CM

## 2024-10-17 DIAGNOSIS — C77.1 SECONDARY MALIGNANT NEOPLASM OF MEDIASTINAL LYMPH NODE: ICD-10-CM

## 2024-10-17 PROCEDURE — 99999 PR PBB SHADOW E&M-EST. PATIENT-LVL III: CPT | Mod: PBBFAC,,, | Performed by: NURSE PRACTITIONER

## 2024-10-17 RX ORDER — MESALAMINE 1000 MG/1
500 SUPPOSITORY RECTAL 2 TIMES DAILY
COMMUNITY

## 2024-10-17 NOTE — PROGRESS NOTES
[facility]/ [facility]   at [facility] Note:      Chief Complaint:   Encounter Diagnoses   Name Primary?    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 female who presents today for follow up for stage IV TNBC. She presents today for a follow up.  She is s/p C4D15 of Abraxane.  Had her infusion yesterday. Outside imaging was located and reviewed. Per radiology, bony disease was present on outside imaging.   Requested pathology review and PDL testing, will discuss with Dr. Anderson if this has come back    Doing generally well.  Notes more fatigue recently  Her ulcerative colitis has flared.  Was having some blood clots in her stool.  Has a GI specialist in [place].  Her mesalamine was increased and she is now using mesalamine suppositories  No fever  No mouth ulcers  No neuropathy, using cryotherapy gloves and socks  No new pain   Eating and drinking well, no n/v    Per Dr. Anderson's note:   Oncology History   Patient with TNBC diagnosed in 2022 in [place], [place]   Routine screening ultrasound demonstrated 11mm mass in the L breast   Biopsy 9/30/22: IDC, grade 3; ER-/[place]-/HER2 0; PDL1 CPS <10    NACT with weekly carboplatin/paclitaxel x 12  Bilateral mastectomy March 2022: residual IDC, 1.7mm; 0 LN+  Adjuvant AC x 4 completed 5/23/23    Routine PET imaging without new  "disease  Follow up June 2024 demonstrated "elevated tumor markers" with high  and LDH  PET 6/11/24: bilateral hilar and mediastinal LAD, 2.7cm KRIS mass  Bronchoscopy 6/18/24: metastatic carcinoma 4R/11L c/w breast carcinoma     Started Keytruda/Abraxane 6/20/24, dropped Keytruda given no PDL1      Patient Active Problem List   Diagnosis    Malignant neoplasm of upper-outer quadrant of left breast in female, estrogen receptor negative    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        Mild fatigue, able to do adls   HENT: Negative.     Respiratory: Negative.     Cardiovascular: Negative.    Gastrointestinal:  Positive for constipation and diarrhea.   Musculoskeletal: Negative.    Neurological: Negative.    All other systems reviewed and are negative.    Physical Exam  Constitutional:       General: She is not in acute distress.     Appearance: Normal appearance. She is not ill-appearing.   HENT:      Head: Normocephalic and atraumatic.   Eyes:      Extraocular Movements: Extraocular movements intact.   Cardiovascular:      Rate and Rhythm: Normal rate and regular rhythm.      Pulses: Normal pulses.      Heart sounds: Normal heart sounds. No murmur heard.  Pulmonary:      Effort: Pulmonary effort is normal. No respiratory distress.      Breath sounds: Normal breath sounds.   Abdominal:      General: Abdomen is flat. Bowel sounds are normal. There is no distension.      " Palpations: Abdomen is soft.      Tenderness: There is no abdominal tenderness.   Musculoskeletal:      Cervical back: Normal range of motion.   Lymphadenopathy:      Cervical: No cervical adenopathy.   Skin:     General: Skin is warm and dry.   Neurological:      General: No focal deficit present.      Mental Status: She is alert and oriented to person, place, and time.       Pertinent Labs & Imaging:  Pathology Results  (Last 30 days)      None            No results found for this or any previous visit (from the past 24 hours).      Imaging:    PET 8/26/24    Impression:     In this patient with history of breast cancer status post bilateral mastectomy and axillary dissection, there is soft tissue thickening with mild FDG radiotracer uptake in the left chest wall which can represent post radiation changes or recurrence.  No prior imaging comparison.     Multiple mediastinal and hilar hypermetabolic lymphadenopathy.     Numerous hypermetabolic sclerotic osseous lesions throughout axial and appendicular skeleton, suggesting osseous metastasis.     Right upper and middle lobe pulmonary nodules largest measure 9 mm.  Recommend correlation with prior imaging.      Assessment & Plan:    1. Malignant neoplasm of upper-outer quadrant of left breast in female, estrogen receptor negative  2. 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 Secondary malignant neoplasm of mediastinal lymph node  4. H/O bilateral mastectomy  -     POST-MASTECTOMY BRA FOR HOME USE  -     BREAST PROSTHESIS FOR HOME USE    5. Carcinoma of breast metastatic to bone, unspecified laterality      Patient with stage IV TNBC on abraxane  Previously receiving Keytruda/Abraxane but review of records show PDL1 CPS <10. No indication for Keytruda based on Keynote 355  Recommend weekly abraxane 3 on/ 1 off pending 2nd opinion on pathology and outside imaging  PET CT on 8/26/24 showing interval decrease in thoracic disease burden but mention of  sclerotic disease in pelvis not mentioned on outside PET report. Review of outside PET showed bony disease present, likely underread at outside location. Will plan short interval repeat so as to establish a better baseline vs early detection of progression  Outside PDL testing pending   Continue Cryotherapy gloves with treatment, reports improved symptoms  Scheduled with Dr. Anderson in 2 weeks after her PET scan  Will see me again in 4 weeks  Infusions 3 weeks on, one week of  Will get zometa scheduled as well.  She is seeing her dentist next week, dental clearance given      Route Chart for Scheduling    Med Onc Chart Routing  Urgent    Follow up with physician 2 weeks and 6 weeks.   Follow up with LINDA 4 weeks.   Infusion scheduling note   3 weeks on, one week off at [facility],  zometa every 4 weeks   Injection scheduling note    Labs CBC and CMP   Scheduling:  Preferred lab:  Lab interval:  3 weeks on, one week off at [facility]   Imaging   Pet scan scheduled   Pharmacy appointment    Other referrals                    Treatment Plan Information   OP BREAST NAB-PACLITAXEL WEEKLY Renetta Anderson MD   Associated diagnosis: Malignant neoplasm of upper-outer quadrant of left breast in female, estrogen receptor negative Stage IV rcTX, cNX, pM1, G3, ER-, [place]-, HER2- noted on 7/16/2024  Associated diagnosis: 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   noted on 7/16/2024  Associated diagnosis: Secondary malignant neoplasm of mediastinal lymph node   noted on 7/16/2024   Line of treatment: First Line  Treatment Goal: Palliative     Upcoming Treatment Dates - OP BREAST NAB-PACLITAXEL WEEKLY    10/30/2024       Chemotherapy       PACLitaxeL protein-bound (ABRAXANE) 100 mg/m2 = 210 mg in 42 mL infusion  11/6/2024       Chemotherapy       PACLitaxeL protein-bound (ABRAXANE) 100 mg/m2 = 210 mg in 42 mL infusion  11/13/2024       Chemotherapy       PACLitaxeL protein-bound (ABRAXANE) 100 mg/m2 = 210 mg in 42 mL  infusion  11/27/2024       Chemotherapy       PACLitaxeL protein-bound (ABRAXANE) 100 mg/m2 = 210 mg in 42 mL infusion    Supportive Plan Information  OP ZOLEDRONIC ACID (ZOMETA) Q4W Renetta Anderson MD   Associated Diagnosis: Malignant neoplasm of upper-outer quadrant of left breast in female, estrogen receptor negative Stage IV rcTX, cNX, pM1, G3, ER-, [place]-, HER2- noted on 7/16/2024   Line of treatment: Supportive Care   Treatment goal: Palliative     Upcoming Treatment Dates - OP ZOLEDRONIC ACID (ZOMETA) Q4W    9/16/2024       Medications       zoledronic 4 mg/100 mL infusion 4 mg  10/14/2024       Medications       zoledronic acid (ZOMETA) 4 mg in 0.9% NaCl 100 mL IVPB  11/11/2024       Medications       zoledronic acid (ZOMETA) 4 mg in 0.9% NaCl 100 mL IVPB  12/9/2024       Medications       zoledronic acid (ZOMETA) 4 mg in 0.9% NaCl 100 mL IVPB    MDM includes  :    - Acute or chronic illness or injury that poses a threat to life or bodily function  - Independent review and explanation of 3+ results from unique tests  - Discussion of management and ordering 3+ unique tests  - Extensive discussion of treatment and management  - Prescription drug management  - Drug therapy requiring intensive monitoring for toxicity    Jacquelyn Apodaca NP   10/17/2024

## 2024-10-18 ENCOUNTER — PATIENT MESSAGE (OUTPATIENT)
Dept: HEMATOLOGY/ONCOLOGY | Facility: CLINIC | Age: 59
End: 2024-10-18
Payer: COMMERCIAL

## 2024-10-18 DIAGNOSIS — C50.412 MALIGNANT NEOPLASM OF UPPER-OUTER QUADRANT OF LEFT BREAST IN FEMALE, ESTROGEN RECEPTOR NEGATIVE: Primary | ICD-10-CM

## 2024-10-18 DIAGNOSIS — Z17.1 MALIGNANT NEOPLASM OF UPPER-OUTER QUADRANT OF LEFT BREAST IN FEMALE, ESTROGEN RECEPTOR NEGATIVE: Primary | ICD-10-CM

## 2024-10-23 ENCOUNTER — LAB VISIT (OUTPATIENT)
Dept: LAB | Facility: HOSPITAL | Age: 59
End: 2024-10-23
Payer: COMMERCIAL

## 2024-10-23 DIAGNOSIS — K51.20 ULCERATIVE (CHRONIC) PROCTITIS: Primary | ICD-10-CM

## 2024-10-23 DIAGNOSIS — K51.20 ULCERATIVE (CHRONIC) PROCTITIS: ICD-10-CM

## 2024-10-23 DIAGNOSIS — K51.20 ULCERATIVE PROCTITIS: Primary | ICD-10-CM

## 2024-10-23 LAB
ALBUMIN SERPL BCP-MCNC: 3.5 G/DL (ref 3.5–5.2)
ALP SERPL-CCNC: 93 U/L (ref 40–150)
ALT SERPL W/O P-5'-P-CCNC: 34 U/L (ref 10–44)
ANION GAP SERPL CALC-SCNC: 10 MMOL/L (ref 8–16)
AST SERPL-CCNC: 31 U/L (ref 10–40)
BASOPHILS # BLD AUTO: 0.04 K/UL (ref 0–0.2)
BASOPHILS NFR BLD: 0.9 % (ref 0–1.9)
BILIRUB SERPL-MCNC: 0.4 MG/DL (ref 0.1–1)
BUN SERPL-MCNC: 10 MG/DL (ref 6–20)
CALCIUM SERPL-MCNC: 9.1 MG/DL (ref 8.7–10.5)
CHLORIDE SERPL-SCNC: 99 MMOL/L (ref 95–110)
CO2 SERPL-SCNC: 28 MMOL/L (ref 23–29)
CREAT SERPL-MCNC: 0.9 MG/DL (ref 0.5–1.4)
CRP SERPL-MCNC: 39.9 MG/L (ref 0–8.2)
DIFFERENTIAL METHOD BLD: ABNORMAL
EOSINOPHIL # BLD AUTO: 0 K/UL (ref 0–0.5)
EOSINOPHIL NFR BLD: 0 % (ref 0–8)
ERYTHROCYTE [DISTWIDTH] IN BLOOD BY AUTOMATED COUNT: 15 % (ref 11.5–14.5)
EST. GFR  (NO RACE VARIABLE): >60 ML/MIN/1.73 M^2
GLUCOSE SERPL-MCNC: 113 MG/DL (ref 70–110)
HCT VFR BLD AUTO: 32.6 % (ref 37–48.5)
HGB BLD-MCNC: 10.9 G/DL (ref 12–16)
IMM GRANULOCYTES # BLD AUTO: 0.04 K/UL (ref 0–0.04)
IMM GRANULOCYTES NFR BLD AUTO: 0.9 % (ref 0–0.5)
LYMPHOCYTES # BLD AUTO: 1.4 K/UL (ref 1–4.8)
LYMPHOCYTES NFR BLD: 30.2 % (ref 18–48)
MCH RBC QN AUTO: 32.7 PG (ref 27–31)
MCHC RBC AUTO-ENTMCNC: 33.4 G/DL (ref 32–36)
MCV RBC AUTO: 98 FL (ref 82–98)
MONOCYTES # BLD AUTO: 0.5 K/UL (ref 0.3–1)
MONOCYTES NFR BLD: 10.7 % (ref 4–15)
NEUTROPHILS # BLD AUTO: 2.6 K/UL (ref 1.8–7.7)
NEUTROPHILS NFR BLD: 57.3 % (ref 38–73)
NRBC BLD-RTO: 0 /100 WBC
PLATELET # BLD AUTO: 290 K/UL (ref 150–450)
PMV BLD AUTO: 8 FL (ref 9.2–12.9)
POTASSIUM SERPL-SCNC: 3.3 MMOL/L (ref 3.5–5.1)
PROT SERPL-MCNC: 7.1 G/DL (ref 6–8.4)
RBC # BLD AUTO: 3.33 M/UL (ref 4–5.4)
SODIUM SERPL-SCNC: 137 MMOL/L (ref 136–145)
WBC # BLD AUTO: 4.5 K/UL (ref 3.9–12.7)

## 2024-10-23 PROCEDURE — 80053 COMPREHEN METABOLIC PANEL: CPT

## 2024-10-23 PROCEDURE — 36415 COLL VENOUS BLD VENIPUNCTURE: CPT

## 2024-10-23 PROCEDURE — 85025 COMPLETE CBC W/AUTO DIFF WBC: CPT

## 2024-10-23 PROCEDURE — 86140 C-REACTIVE PROTEIN: CPT

## 2024-10-24 ENCOUNTER — HOSPITAL ENCOUNTER (OUTPATIENT)
Dept: RADIOLOGY | Facility: HOSPITAL | Age: 59
Discharge: HOME | End: 2024-10-24
Attending: INTERNAL MEDICINE
Payer: COMMERCIAL

## 2024-10-24 DIAGNOSIS — C78.02 SECONDARY MALIGNANT NEOPLASM OF HILUS OF LEFT LUNG: ICD-10-CM

## 2024-10-24 DIAGNOSIS — C50.412 MALIGNANT NEOPLASM OF UPPER-OUTER QUADRANT OF LEFT BREAST IN FEMALE, ESTROGEN RECEPTOR NEGATIVE: ICD-10-CM

## 2024-10-24 DIAGNOSIS — Z17.1 MALIGNANT NEOPLASM OF UPPER-OUTER QUADRANT OF LEFT BREAST IN FEMALE, ESTROGEN RECEPTOR NEGATIVE: ICD-10-CM

## 2024-10-24 LAB — POCT GLUCOSE: 113 MG/DL (ref 70–110)

## 2024-10-24 PROCEDURE — 78815 PET IMAGE W/CT SKULL-THIGH: CPT | Mod: TC

## 2024-10-24 PROCEDURE — A9552 F18 FDG: HCPCS | Performed by: INTERNAL MEDICINE

## 2024-10-24 PROCEDURE — 78815 PET IMAGE W/CT SKULL-THIGH: CPT | Mod: 26,PS,, | Performed by: NUCLEAR MEDICINE

## 2024-10-24 RX ORDER — FLUDEOXYGLUCOSE F18 500 MCI/ML
12 INJECTION INTRAVENOUS
Status: COMPLETED | OUTPATIENT
Start: 2024-10-24 | End: 2024-10-24

## 2024-10-24 RX ADMIN — FLUDEOXYGLUCOSE F-18 12.35 MILLICURIE: 500 INJECTION INTRAVENOUS at 09:10

## 2024-10-25 ENCOUNTER — HOSPITAL ENCOUNTER (OUTPATIENT)
Dept: RADIOLOGY | Facility: HOSPITAL | Age: 59
Discharge: HOME | End: 2024-10-25
Attending: INTERNAL MEDICINE
Payer: COMMERCIAL

## 2024-10-25 DIAGNOSIS — K51.20 ULCERATIVE PROCTITIS: ICD-10-CM

## 2024-10-25 PROCEDURE — 25500020 PHARM REV CODE 255: Performed by: PHYSICIAN ASSISTANT

## 2024-10-25 PROCEDURE — 74177 CT ABD & PELVIS W/CONTRAST: CPT | Mod: 26,,, | Performed by: RADIOLOGY

## 2024-10-25 PROCEDURE — 74177 CT ABD & PELVIS W/CONTRAST: CPT | Mod: TC

## 2024-10-25 RX ADMIN — IOHEXOL 75 ML: 350 INJECTION, SOLUTION INTRAVENOUS at 02:10

## 2024-10-25 RX ADMIN — IOHEXOL 15 ML: 300 INJECTION, SOLUTION INTRAVENOUS at 02:10

## 2024-10-28 ENCOUNTER — OFFICE VISIT (OUTPATIENT)
Dept: HEMATOLOGY/ONCOLOGY | Facility: CLINIC | Age: 59
End: 2024-10-28
Payer: COMMERCIAL

## 2024-10-28 VITALS
OXYGEN SATURATION: 98 % | TEMPERATURE: 98 F | HEIGHT: 66 IN | RESPIRATION RATE: 12 BRPM | DIASTOLIC BLOOD PRESSURE: 82 MMHG | WEIGHT: 200.38 LBS | BODY MASS INDEX: 32.2 KG/M2 | SYSTOLIC BLOOD PRESSURE: 119 MMHG | HEART RATE: 85 BPM

## 2024-10-28 DIAGNOSIS — K51.211 ULCERATIVE PROCTITIS WITH RECTAL BLEEDING: Primary | ICD-10-CM

## 2024-10-28 DIAGNOSIS — E87.6 HYPOKALEMIA: ICD-10-CM

## 2024-10-28 DIAGNOSIS — C77.1 SECONDARY MALIGNANT NEOPLASM OF MEDIASTINAL LYMPH NODE: ICD-10-CM

## 2024-10-28 DIAGNOSIS — C78.02 SECONDARY MALIGNANT NEOPLASM OF HILUS OF LEFT LUNG: ICD-10-CM

## 2024-10-28 DIAGNOSIS — C50.919 CARCINOMA OF BREAST METASTATIC TO BONE, UNSPECIFIED LATERALITY: ICD-10-CM

## 2024-10-28 DIAGNOSIS — Z17.1 MALIGNANT NEOPLASM OF UPPER-OUTER QUADRANT OF LEFT BREAST IN FEMALE, ESTROGEN RECEPTOR NEGATIVE: ICD-10-CM

## 2024-10-28 DIAGNOSIS — C79.51 CARCINOMA OF BREAST METASTATIC TO BONE, UNSPECIFIED LATERALITY: ICD-10-CM

## 2024-10-28 DIAGNOSIS — C50.412 MALIGNANT NEOPLASM OF UPPER-OUTER QUADRANT OF LEFT BREAST IN FEMALE, ESTROGEN RECEPTOR NEGATIVE: ICD-10-CM

## 2024-10-28 PROCEDURE — 99999 PR PBB SHADOW E&M-EST. PATIENT-LVL III: CPT | Mod: PBBFAC,,, | Performed by: INTERNAL MEDICINE

## 2024-10-28 RX ORDER — POTASSIUM CHLORIDE 750 MG/1
10 TABLET, EXTENDED RELEASE ORAL DAILY
Qty: 90 TABLET | Refills: 3 | Status: SHIPPED | OUTPATIENT
Start: 2024-10-28 | End: 2025-10-28

## 2024-10-29 ENCOUNTER — LAB VISIT (OUTPATIENT)
Dept: LAB | Facility: HOSPITAL | Age: 59
End: 2024-10-29
Attending: INTERNAL MEDICINE
Payer: COMMERCIAL

## 2024-10-29 DIAGNOSIS — C50.412 MALIGNANT NEOPLASM OF UPPER-OUTER QUADRANT OF LEFT BREAST IN FEMALE, ESTROGEN RECEPTOR NEGATIVE: ICD-10-CM

## 2024-10-29 DIAGNOSIS — R73.09 ELEVATED GLUCOSE: ICD-10-CM

## 2024-10-29 DIAGNOSIS — Z17.1 MALIGNANT NEOPLASM OF UPPER-OUTER QUADRANT OF LEFT BREAST IN FEMALE, ESTROGEN RECEPTOR NEGATIVE: ICD-10-CM

## 2024-10-29 DIAGNOSIS — C78.02 SECONDARY MALIGNANT NEOPLASM OF HILUS OF LEFT LUNG: ICD-10-CM

## 2024-10-29 LAB
ALBUMIN SERPL BCP-MCNC: 3.3 G/DL (ref 3.5–5.2)
ALP SERPL-CCNC: 86 U/L (ref 40–150)
ALT SERPL W/O P-5'-P-CCNC: 34 U/L (ref 10–44)
ANION GAP SERPL CALC-SCNC: 10 MMOL/L (ref 8–16)
AST SERPL-CCNC: 31 U/L (ref 10–40)
BASOPHILS # BLD AUTO: 0.02 K/UL (ref 0–0.2)
BASOPHILS NFR BLD: 0.5 % (ref 0–1.9)
BILIRUB SERPL-MCNC: 0.4 MG/DL (ref 0.1–1)
BUN SERPL-MCNC: 10 MG/DL (ref 6–20)
CALCIUM SERPL-MCNC: 8.9 MG/DL (ref 8.7–10.5)
CHLORIDE SERPL-SCNC: 99 MMOL/L (ref 95–110)
CO2 SERPL-SCNC: 29 MMOL/L (ref 23–29)
CREAT SERPL-MCNC: 0.8 MG/DL (ref 0.5–1.4)
DIFFERENTIAL METHOD BLD: ABNORMAL
EOSINOPHIL # BLD AUTO: 0 K/UL (ref 0–0.5)
EOSINOPHIL NFR BLD: 0 % (ref 0–8)
ERYTHROCYTE [DISTWIDTH] IN BLOOD BY AUTOMATED COUNT: 15.1 % (ref 11.5–14.5)
EST. GFR  (NO RACE VARIABLE): >60 ML/MIN/1.73 M^2
ESTIMATED AVG GLUCOSE: 82 MG/DL (ref 68–131)
GLUCOSE SERPL-MCNC: 132 MG/DL (ref 70–110)
HBA1C MFR BLD: 4.5 % (ref 4–5.6)
HCT VFR BLD AUTO: 33 % (ref 37–48.5)
HGB BLD-MCNC: 11.2 G/DL (ref 12–16)
IMM GRANULOCYTES # BLD AUTO: 0.02 K/UL (ref 0–0.04)
IMM GRANULOCYTES NFR BLD AUTO: 0.5 % (ref 0–0.5)
LYMPHOCYTES # BLD AUTO: 1.2 K/UL (ref 1–4.8)
LYMPHOCYTES NFR BLD: 28.1 % (ref 18–48)
MCH RBC QN AUTO: 32.7 PG (ref 27–31)
MCHC RBC AUTO-ENTMCNC: 33.9 G/DL (ref 32–36)
MCV RBC AUTO: 96 FL (ref 82–98)
MONOCYTES # BLD AUTO: 0.5 K/UL (ref 0.3–1)
MONOCYTES NFR BLD: 11.8 % (ref 4–15)
NEUTROPHILS # BLD AUTO: 2.5 K/UL (ref 1.8–7.7)
NEUTROPHILS NFR BLD: 59.1 % (ref 38–73)
NRBC BLD-RTO: 0 /100 WBC
PLATELET # BLD AUTO: 253 K/UL (ref 150–450)
PMV BLD AUTO: 8.1 FL (ref 9.2–12.9)
POTASSIUM SERPL-SCNC: 3.5 MMOL/L (ref 3.5–5.1)
PROT SERPL-MCNC: 6.9 G/DL (ref 6–8.4)
RBC # BLD AUTO: 3.43 M/UL (ref 4–5.4)
SODIUM SERPL-SCNC: 138 MMOL/L (ref 136–145)
WBC # BLD AUTO: 4.23 K/UL (ref 3.9–12.7)

## 2024-10-29 PROCEDURE — 80053 COMPREHEN METABOLIC PANEL: CPT | Performed by: INTERNAL MEDICINE

## 2024-10-29 PROCEDURE — 85025 COMPLETE CBC W/AUTO DIFF WBC: CPT | Performed by: INTERNAL MEDICINE

## 2024-10-29 PROCEDURE — 36415 COLL VENOUS BLD VENIPUNCTURE: CPT | Performed by: NURSE PRACTITIONER

## 2024-10-29 PROCEDURE — 83036 HEMOGLOBIN GLYCOSYLATED A1C: CPT | Performed by: NURSE PRACTITIONER

## 2024-10-29 RX ORDER — HEPARIN 100 UNIT/ML
500 SYRINGE INTRAVENOUS
OUTPATIENT
Start: 2024-11-13

## 2024-10-29 RX ORDER — HEPARIN 100 UNIT/ML
500 SYRINGE INTRAVENOUS
OUTPATIENT
Start: 2024-11-06

## 2024-10-29 RX ORDER — PROCHLORPERAZINE EDISYLATE 5 MG/ML
10 INJECTION INTRAMUSCULAR; INTRAVENOUS ONCE AS NEEDED
Status: CANCELLED
Start: 2024-10-30

## 2024-10-29 RX ORDER — PROCHLORPERAZINE EDISYLATE 5 MG/ML
10 INJECTION INTRAMUSCULAR; INTRAVENOUS ONCE AS NEEDED
Start: 2024-11-13

## 2024-10-29 RX ORDER — DIPHENHYDRAMINE HYDROCHLORIDE 50 MG/ML
50 INJECTION INTRAMUSCULAR; INTRAVENOUS ONCE AS NEEDED
OUTPATIENT
Start: 2024-11-13

## 2024-10-29 RX ORDER — DIPHENHYDRAMINE HYDROCHLORIDE 50 MG/ML
50 INJECTION INTRAMUSCULAR; INTRAVENOUS ONCE AS NEEDED
Status: CANCELLED | OUTPATIENT
Start: 2024-10-30

## 2024-10-29 RX ORDER — EPINEPHRINE 0.3 MG/.3ML
0.3 INJECTION SUBCUTANEOUS ONCE AS NEEDED
OUTPATIENT
Start: 2024-11-13

## 2024-10-29 RX ORDER — DIPHENHYDRAMINE HYDROCHLORIDE 50 MG/ML
50 INJECTION INTRAMUSCULAR; INTRAVENOUS ONCE AS NEEDED
OUTPATIENT
Start: 2024-11-06

## 2024-10-29 RX ORDER — HEPARIN 100 UNIT/ML
500 SYRINGE INTRAVENOUS
Status: CANCELLED | OUTPATIENT
Start: 2024-10-30

## 2024-10-29 RX ORDER — SODIUM CHLORIDE 0.9 % (FLUSH) 0.9 %
10 SYRINGE (ML) INJECTION
Status: CANCELLED | OUTPATIENT
Start: 2024-10-30

## 2024-10-29 RX ORDER — EPINEPHRINE 0.3 MG/.3ML
0.3 INJECTION SUBCUTANEOUS ONCE AS NEEDED
OUTPATIENT
Start: 2024-11-06

## 2024-10-29 RX ORDER — EPINEPHRINE 0.3 MG/.3ML
0.3 INJECTION SUBCUTANEOUS ONCE AS NEEDED
Status: CANCELLED | OUTPATIENT
Start: 2024-10-30

## 2024-10-29 RX ORDER — PROCHLORPERAZINE EDISYLATE 5 MG/ML
10 INJECTION INTRAMUSCULAR; INTRAVENOUS ONCE AS NEEDED
Start: 2024-11-06

## 2024-10-29 RX ORDER — SODIUM CHLORIDE 0.9 % (FLUSH) 0.9 %
10 SYRINGE (ML) INJECTION
OUTPATIENT
Start: 2024-11-13

## 2024-10-29 RX ORDER — SODIUM CHLORIDE 0.9 % (FLUSH) 0.9 %
10 SYRINGE (ML) INJECTION
OUTPATIENT
Start: 2024-11-06

## 2024-10-30 ENCOUNTER — INFUSION (OUTPATIENT)
Dept: INFUSION THERAPY | Facility: HOSPITAL | Age: 59
End: 2024-10-30
Attending: INTERNAL MEDICINE
Payer: COMMERCIAL

## 2024-10-30 VITALS
BODY MASS INDEX: 32.59 KG/M2 | DIASTOLIC BLOOD PRESSURE: 63 MMHG | WEIGHT: 201.94 LBS | SYSTOLIC BLOOD PRESSURE: 105 MMHG

## 2024-10-30 DIAGNOSIS — Z17.1 MALIGNANT NEOPLASM OF UPPER-OUTER QUADRANT OF LEFT BREAST IN FEMALE, ESTROGEN RECEPTOR NEGATIVE: Primary | ICD-10-CM

## 2024-10-30 DIAGNOSIS — C77.1 SECONDARY MALIGNANT NEOPLASM OF MEDIASTINAL LYMPH NODE: ICD-10-CM

## 2024-10-30 DIAGNOSIS — C50.412 MALIGNANT NEOPLASM OF UPPER-OUTER QUADRANT OF LEFT BREAST IN FEMALE, ESTROGEN RECEPTOR NEGATIVE: Primary | ICD-10-CM

## 2024-10-30 DIAGNOSIS — C78.02 SECONDARY MALIGNANT NEOPLASM OF HILUS OF LEFT LUNG: ICD-10-CM

## 2024-10-30 PROCEDURE — 96413 CHEMO IV INFUSION 1 HR: CPT

## 2024-10-30 PROCEDURE — 96375 TX/PRO/DX INJ NEW DRUG ADDON: CPT

## 2024-10-30 PROCEDURE — 63600175 PHARM REV CODE 636 W HCPCS: Mod: JZ,JG | Performed by: INTERNAL MEDICINE

## 2024-10-30 RX ORDER — ZOLEDRONIC ACID 0.04 MG/ML
4 INJECTION, SOLUTION INTRAVENOUS
Status: COMPLETED | OUTPATIENT
Start: 2024-10-30 | End: 2024-10-30

## 2024-10-30 RX ORDER — HEPARIN 100 UNIT/ML
500 SYRINGE INTRAVENOUS
Status: DISCONTINUED | OUTPATIENT
Start: 2024-10-30 | End: 2024-10-30 | Stop reason: HOSPADM

## 2024-10-30 RX ADMIN — ZOLEDRONIC ACID 4 MG: 0.04 INJECTION, SOLUTION INTRAVENOUS at 10:10

## 2024-10-30 RX ADMIN — HEPARIN 500 UNITS: 100 SYRINGE at 11:10

## 2024-10-30 RX ADMIN — PACLITAXEL 200 MG: 100 INJECTION, POWDER, LYOPHILIZED, FOR SUSPENSION INTRAVENOUS at 10:10

## 2024-10-31 ENCOUNTER — E-CONSULT (OUTPATIENT)
Dept: GASTROENTEROLOGY | Facility: CLINIC | Age: 59
End: 2024-10-31
Payer: COMMERCIAL

## 2024-10-31 ENCOUNTER — PATIENT MESSAGE (OUTPATIENT)
Dept: HEMATOLOGY/ONCOLOGY | Facility: CLINIC | Age: 59
End: 2024-10-31
Payer: COMMERCIAL

## 2024-10-31 DIAGNOSIS — K51.211 ULCERATIVE PROCTITIS WITH RECTAL BLEEDING: Primary | ICD-10-CM

## 2024-11-05 ENCOUNTER — LAB VISIT (OUTPATIENT)
Dept: LAB | Facility: HOSPITAL | Age: 59
End: 2024-11-05
Attending: INTERNAL MEDICINE
Payer: COMMERCIAL

## 2024-11-05 DIAGNOSIS — Z17.1 MALIGNANT NEOPLASM OF UPPER-OUTER QUADRANT OF LEFT BREAST IN FEMALE, ESTROGEN RECEPTOR NEGATIVE: ICD-10-CM

## 2024-11-05 DIAGNOSIS — C50.412 MALIGNANT NEOPLASM OF UPPER-OUTER QUADRANT OF LEFT BREAST IN FEMALE, ESTROGEN RECEPTOR NEGATIVE: ICD-10-CM

## 2024-11-05 DIAGNOSIS — C78.02 SECONDARY MALIGNANT NEOPLASM OF HILUS OF LEFT LUNG: ICD-10-CM

## 2024-11-05 LAB
ALBUMIN SERPL BCP-MCNC: 3.3 G/DL (ref 3.5–5.2)
ALP SERPL-CCNC: 78 U/L (ref 40–150)
ALT SERPL W/O P-5'-P-CCNC: 30 U/L (ref 10–44)
ANION GAP SERPL CALC-SCNC: 8 MMOL/L (ref 8–16)
AST SERPL-CCNC: 26 U/L (ref 10–40)
BASOPHILS # BLD AUTO: 0.02 K/UL (ref 0–0.2)
BASOPHILS NFR BLD: 0.4 % (ref 0–1.9)
BILIRUB SERPL-MCNC: 0.4 MG/DL (ref 0.1–1)
BUN SERPL-MCNC: 9 MG/DL (ref 6–20)
CALCIUM SERPL-MCNC: 9 MG/DL (ref 8.7–10.5)
CHLORIDE SERPL-SCNC: 101 MMOL/L (ref 95–110)
CO2 SERPL-SCNC: 27 MMOL/L (ref 23–29)
CREAT SERPL-MCNC: 0.8 MG/DL (ref 0.5–1.4)
DIFFERENTIAL METHOD BLD: ABNORMAL
EOSINOPHIL # BLD AUTO: 0 K/UL (ref 0–0.5)
EOSINOPHIL NFR BLD: 0 % (ref 0–8)
ERYTHROCYTE [DISTWIDTH] IN BLOOD BY AUTOMATED COUNT: 14.7 % (ref 11.5–14.5)
EST. GFR  (NO RACE VARIABLE): >60 ML/MIN/1.73 M^2
GLUCOSE SERPL-MCNC: 123 MG/DL (ref 70–110)
HCT VFR BLD AUTO: 31.6 % (ref 37–48.5)
HGB BLD-MCNC: 10.7 G/DL (ref 12–16)
IMM GRANULOCYTES # BLD AUTO: 0.02 K/UL (ref 0–0.04)
IMM GRANULOCYTES NFR BLD AUTO: 0.4 % (ref 0–0.5)
LYMPHOCYTES # BLD AUTO: 1.5 K/UL (ref 1–4.8)
LYMPHOCYTES NFR BLD: 28.8 % (ref 18–48)
MCH RBC QN AUTO: 32.4 PG (ref 27–31)
MCHC RBC AUTO-ENTMCNC: 33.9 G/DL (ref 32–36)
MCV RBC AUTO: 96 FL (ref 82–98)
MONOCYTES # BLD AUTO: 0.3 K/UL (ref 0.3–1)
MONOCYTES NFR BLD: 6.3 % (ref 4–15)
NEUTROPHILS # BLD AUTO: 3.4 K/UL (ref 1.8–7.7)
NEUTROPHILS NFR BLD: 64.1 % (ref 38–73)
NRBC BLD-RTO: 0 /100 WBC
PLATELET # BLD AUTO: 285 K/UL (ref 150–450)
PMV BLD AUTO: 7.7 FL (ref 9.2–12.9)
POTASSIUM SERPL-SCNC: 3.5 MMOL/L (ref 3.5–5.1)
PROT SERPL-MCNC: 7 G/DL (ref 6–8.4)
RBC # BLD AUTO: 3.3 M/UL (ref 4–5.4)
SODIUM SERPL-SCNC: 136 MMOL/L (ref 136–145)
WBC # BLD AUTO: 5.27 K/UL (ref 3.9–12.7)

## 2024-11-05 PROCEDURE — 80053 COMPREHEN METABOLIC PANEL: CPT | Performed by: INTERNAL MEDICINE

## 2024-11-05 PROCEDURE — 85025 COMPLETE CBC W/AUTO DIFF WBC: CPT | Performed by: INTERNAL MEDICINE

## 2024-11-05 PROCEDURE — 36415 COLL VENOUS BLD VENIPUNCTURE: CPT | Performed by: INTERNAL MEDICINE

## 2024-11-06 ENCOUNTER — INFUSION (OUTPATIENT)
Dept: INFUSION THERAPY | Facility: HOSPITAL | Age: 59
End: 2024-11-06
Attending: INTERNAL MEDICINE
Payer: COMMERCIAL

## 2024-11-06 VITALS
HEART RATE: 82 BPM | OXYGEN SATURATION: 97 % | HEIGHT: 66 IN | WEIGHT: 202.63 LBS | TEMPERATURE: 99 F | RESPIRATION RATE: 18 BRPM | DIASTOLIC BLOOD PRESSURE: 64 MMHG | SYSTOLIC BLOOD PRESSURE: 125 MMHG | BODY MASS INDEX: 32.56 KG/M2

## 2024-11-06 DIAGNOSIS — Z17.1 MALIGNANT NEOPLASM OF UPPER-OUTER QUADRANT OF LEFT BREAST IN FEMALE, ESTROGEN RECEPTOR NEGATIVE: Primary | ICD-10-CM

## 2024-11-06 DIAGNOSIS — C50.412 MALIGNANT NEOPLASM OF UPPER-OUTER QUADRANT OF LEFT BREAST IN FEMALE, ESTROGEN RECEPTOR NEGATIVE: Primary | ICD-10-CM

## 2024-11-06 DIAGNOSIS — C77.1 SECONDARY MALIGNANT NEOPLASM OF MEDIASTINAL LYMPH NODE: ICD-10-CM

## 2024-11-06 DIAGNOSIS — C78.02 SECONDARY MALIGNANT NEOPLASM OF HILUS OF LEFT LUNG: ICD-10-CM

## 2024-11-06 PROCEDURE — 96413 CHEMO IV INFUSION 1 HR: CPT

## 2024-11-06 PROCEDURE — 63600175 PHARM REV CODE 636 W HCPCS: Mod: JZ,JG | Performed by: INTERNAL MEDICINE

## 2024-11-06 RX ORDER — PROCHLORPERAZINE EDISYLATE 5 MG/ML
10 INJECTION INTRAMUSCULAR; INTRAVENOUS ONCE AS NEEDED
Status: DISCONTINUED | OUTPATIENT
Start: 2024-11-06 | End: 2024-11-08 | Stop reason: HOSPADM

## 2024-11-06 RX ORDER — HEPARIN 100 UNIT/ML
500 SYRINGE INTRAVENOUS
Status: DISCONTINUED | OUTPATIENT
Start: 2024-11-06 | End: 2024-11-08 | Stop reason: HOSPADM

## 2024-11-06 RX ORDER — SODIUM CHLORIDE 0.9 % (FLUSH) 0.9 %
10 SYRINGE (ML) INJECTION
Status: DISCONTINUED | OUTPATIENT
Start: 2024-11-06 | End: 2024-11-08 | Stop reason: HOSPADM

## 2024-11-06 RX ADMIN — PACLITAXEL 200 MG: 100 INJECTION, POWDER, LYOPHILIZED, FOR SUSPENSION INTRAVENOUS at 10:11

## 2024-11-06 RX ADMIN — HEPARIN 500 UNITS: 100 SYRINGE at 10:11

## 2024-11-06 NOTE — NURSING
Pt continuously monitored while being infused. Pt denies SOB, CP, N/V/D. Pt AAOX4, speech clear, ambulates with steady gait upon exit and entrance of infusion suite. Pt educated on tx by primary RN. Pt tolerated tx well no adverse reactions noted.

## 2024-11-07 ENCOUNTER — TELEPHONE (OUTPATIENT)
Dept: GASTROENTEROLOGY | Facility: CLINIC | Age: 59
End: 2024-11-07
Payer: COMMERCIAL

## 2024-11-07 ENCOUNTER — LAB VISIT (OUTPATIENT)
Dept: LAB | Facility: HOSPITAL | Age: 59
End: 2024-11-07
Attending: INTERNAL MEDICINE
Payer: COMMERCIAL

## 2024-11-07 DIAGNOSIS — K51.211 ULCERATIVE PROCTITIS WITH RECTAL BLEEDING: ICD-10-CM

## 2024-11-07 PROCEDURE — 83993 ASSAY FOR CALPROTECTIN FECAL: CPT | Performed by: INTERNAL MEDICINE

## 2024-11-08 NOTE — PLAN OF CARE
Patient arrived to unit unaccompanied for Day 8, Cycle 5 Abraxane. Labs from 11/5 within treatment parameters. VSS. Today she denies any new or worsening symptoms and has no current concerns/complaints. Received abraxane. Tolerated chemo infusion well. No adverse reactions noted during visit. [facility] Patient verbalized understanding and ambulated unassisted off unit. In NAD at time of dc.

## 2024-11-11 LAB — CALPROTECTIN STL-MCNT: 728 MCG/G

## 2024-11-12 ENCOUNTER — LAB VISIT (OUTPATIENT)
Dept: LAB | Facility: HOSPITAL | Age: 59
End: 2024-11-12
Attending: INTERNAL MEDICINE
Payer: COMMERCIAL

## 2024-11-12 ENCOUNTER — OFFICE VISIT (OUTPATIENT)
Dept: HEMATOLOGY/ONCOLOGY | Facility: CLINIC | Age: 59
End: 2024-11-12
Payer: COMMERCIAL

## 2024-11-12 VITALS
BODY MASS INDEX: 32.47 KG/M2 | DIASTOLIC BLOOD PRESSURE: 72 MMHG | WEIGHT: 202 LBS | TEMPERATURE: 98 F | HEIGHT: 66 IN | RESPIRATION RATE: 18 BRPM | SYSTOLIC BLOOD PRESSURE: 127 MMHG | OXYGEN SATURATION: 100 % | HEART RATE: 78 BPM

## 2024-11-12 DIAGNOSIS — C77.1 SECONDARY MALIGNANT NEOPLASM OF MEDIASTINAL LYMPH NODE: ICD-10-CM

## 2024-11-12 DIAGNOSIS — E87.6 HYPOKALEMIA: ICD-10-CM

## 2024-11-12 DIAGNOSIS — Z17.1 MALIGNANT NEOPLASM OF UPPER-OUTER QUADRANT OF LEFT BREAST IN FEMALE, ESTROGEN RECEPTOR NEGATIVE: ICD-10-CM

## 2024-11-12 DIAGNOSIS — C78.02 SECONDARY MALIGNANT NEOPLASM OF HILUS OF LEFT LUNG: ICD-10-CM

## 2024-11-12 DIAGNOSIS — C79.51 CARCINOMA OF BREAST METASTATIC TO BONE, UNSPECIFIED LATERALITY: ICD-10-CM

## 2024-11-12 DIAGNOSIS — C50.412 MALIGNANT NEOPLASM OF UPPER-OUTER QUADRANT OF LEFT BREAST IN FEMALE, ESTROGEN RECEPTOR NEGATIVE: ICD-10-CM

## 2024-11-12 DIAGNOSIS — C50.412 MALIGNANT NEOPLASM OF UPPER-OUTER QUADRANT OF LEFT BREAST IN FEMALE, ESTROGEN RECEPTOR NEGATIVE: Primary | ICD-10-CM

## 2024-11-12 DIAGNOSIS — C50.919 CARCINOMA OF BREAST METASTATIC TO BONE, UNSPECIFIED LATERALITY: ICD-10-CM

## 2024-11-12 DIAGNOSIS — K51.211 ULCERATIVE PROCTITIS WITH RECTAL BLEEDING: ICD-10-CM

## 2024-11-12 DIAGNOSIS — Z17.1 MALIGNANT NEOPLASM OF UPPER-OUTER QUADRANT OF LEFT BREAST IN FEMALE, ESTROGEN RECEPTOR NEGATIVE: Primary | ICD-10-CM

## 2024-11-12 LAB
ALBUMIN SERPL BCP-MCNC: 3.5 G/DL (ref 3.5–5.2)
ALP SERPL-CCNC: 85 U/L (ref 40–150)
ALT SERPL W/O P-5'-P-CCNC: 27 U/L (ref 10–44)
ANION GAP SERPL CALC-SCNC: 8 MMOL/L (ref 8–16)
AST SERPL-CCNC: 25 U/L (ref 10–40)
BASOPHILS # BLD AUTO: 0.03 K/UL (ref 0–0.2)
BASOPHILS NFR BLD: 0.9 % (ref 0–1.9)
BILIRUB SERPL-MCNC: 0.4 MG/DL (ref 0.1–1)
BUN SERPL-MCNC: 9 MG/DL (ref 6–20)
CALCIUM SERPL-MCNC: 9.1 MG/DL (ref 8.7–10.5)
CHLORIDE SERPL-SCNC: 102 MMOL/L (ref 95–110)
CO2 SERPL-SCNC: 28 MMOL/L (ref 23–29)
CREAT SERPL-MCNC: 0.8 MG/DL (ref 0.5–1.4)
DIFFERENTIAL METHOD BLD: ABNORMAL
EOSINOPHIL # BLD AUTO: 0 K/UL (ref 0–0.5)
EOSINOPHIL NFR BLD: 0 % (ref 0–8)
ERYTHROCYTE [DISTWIDTH] IN BLOOD BY AUTOMATED COUNT: 15.3 % (ref 11.5–14.5)
EST. GFR  (NO RACE VARIABLE): >60 ML/MIN/1.73 M^2
GLUCOSE SERPL-MCNC: 91 MG/DL (ref 70–110)
HCT VFR BLD AUTO: 32.4 % (ref 37–48.5)
HGB BLD-MCNC: 10.9 G/DL (ref 12–16)
IMM GRANULOCYTES # BLD AUTO: 0.02 K/UL (ref 0–0.04)
IMM GRANULOCYTES NFR BLD AUTO: 0.6 % (ref 0–0.5)
LYMPHOCYTES # BLD AUTO: 1.4 K/UL (ref 1–4.8)
LYMPHOCYTES NFR BLD: 41.2 % (ref 18–48)
MCH RBC QN AUTO: 33 PG (ref 27–31)
MCHC RBC AUTO-ENTMCNC: 33.6 G/DL (ref 32–36)
MCV RBC AUTO: 98 FL (ref 82–98)
MONOCYTES # BLD AUTO: 0.3 K/UL (ref 0.3–1)
MONOCYTES NFR BLD: 10.3 % (ref 4–15)
NEUTROPHILS # BLD AUTO: 1.6 K/UL (ref 1.8–7.7)
NEUTROPHILS NFR BLD: 47 % (ref 38–73)
NRBC BLD-RTO: 0 /100 WBC
PLATELET # BLD AUTO: 298 K/UL (ref 150–450)
PMV BLD AUTO: 8 FL (ref 9.2–12.9)
POTASSIUM SERPL-SCNC: 3.2 MMOL/L (ref 3.5–5.1)
PROT SERPL-MCNC: 7 G/DL (ref 6–8.4)
RBC # BLD AUTO: 3.3 M/UL (ref 4–5.4)
SODIUM SERPL-SCNC: 138 MMOL/L (ref 136–145)
WBC # BLD AUTO: 3.3 K/UL (ref 3.9–12.7)

## 2024-11-12 PROCEDURE — 85025 COMPLETE CBC W/AUTO DIFF WBC: CPT | Performed by: INTERNAL MEDICINE

## 2024-11-12 PROCEDURE — 36415 COLL VENOUS BLD VENIPUNCTURE: CPT | Performed by: INTERNAL MEDICINE

## 2024-11-12 PROCEDURE — 80053 COMPREHEN METABOLIC PANEL: CPT | Performed by: INTERNAL MEDICINE

## 2024-11-12 PROCEDURE — 99999 PR PBB SHADOW E&M-EST. PATIENT-LVL III: CPT | Mod: PBBFAC,,, | Performed by: NURSE PRACTITIONER

## 2024-11-12 RX ORDER — POTASSIUM CHLORIDE 750 MG/1
20 TABLET, EXTENDED RELEASE ORAL DAILY
Qty: 180 TABLET | Refills: 3 | Status: SHIPPED | OUTPATIENT
Start: 2024-11-12 | End: 2025-11-12

## 2024-11-12 NOTE — PROGRESS NOTES
[facility]          Inflammatory Bowel Disease          New Patient Note         TODAY'S VISIT DATE:  11/14/2024    Reason for Consult:    Chief Complaint   Patient presents with    Ulcerative Colitis     PCP: Lennie, Primary Doctor      Referring MD:   Dr. Renetta Anderson    History of Present Illness:    Dear. Dr. Renetta Anderson    Thank you for your referral on your patient Jacki Stone who is a 59 y.o. female seen today at the [facility] for ulcerative colitis  with pertinent past medical history including chronic constipation, stage IV breast cancer and ulcerative colitis. She has struggled with chronic constipation most of her life and has failed multiple prescription meds and non-prescription meds (linzess and trulance caused severe diarrhea, miralax not fully effective) and then medicine that works best for her is Dr. Eliud shannon 14 day cleanse 2 capsules qod (cascara sagrada dark, pysllium husk, senna leafe, flaxseed powder, lactobicillus, aloe vera, licorice root, MCT oil).  Patient was doing well until 2019 when she began with rectal bleeding and initially thoughout due to hemorrhoids and given cream and then saw 2nd opinion with GI (DR. Marte, in [place], [place]) and proceeded with colonsocopy. Colonoscopy 4/2019 showed moderate to severe inflammation and ulceration in the rectum and distal sigmoid extending from rectum to around 35 cm, proximally he colon is normal. Biopsies proximal sigmoid with rare focus of increased acute and chronic inflammatory cells ini the lamina propria and possible adjacent gland damage with scattered pigmented macrophages in the lamina propria c/w melanosis coli.  Distal sigmoid and rectum with active chronic colitis.  Pt started on canasa BID (took this for approximately 1 month) and oral mesalamine 4.8 g/d.  Within a few weeks acute symptoms resolved.  Approximately 3 mos later GI decreased mesalamine to 2.4  g/d and since [id number] took oral mesalamine 1.2 g/d. In 9/2021 patient had colonoscopy c/w normal colon and ICV with biopsies of right colon c/w ischemic type erosion (david vs nsaid injury), melanosis coli, left colon with melanosis coli, rectum with edema and trace melanosis coli. At f/u visit 9/2021 pt reported continued constipation and in past linzess and trulance caused severe diarrhea and had recently taking mesalamine 1.2 g/bd and also had 2-3 episodes of slight recatl bleeding and rectal pain so self increased back to lialda 2.4 g/d for a few mos and then decreased back to 1.2 g/d.  In 8/2024 4-5 soft BMs qod on the days she takes her 2 capsules of Dr. Kline colon cleanse.  when she takes her colon cleanse treatment (2 capsules qod) and approximately 75% of the stool had bright red on the stool and this prompted a colonoscopy 9/30/24 which was significant for patchy erythema in the left colon and rectum c/w ulcerative colitis vs Crohns' disease and active inflammation in the distal rectum with fairly normal appearing proximal rectum and then again moderate inflammatory changes in the distal sigmoid colon with normal ICV and TI.  Biopsies of right colon c/w florid melanosis coli, left colon with architectural distortion with plasma cellular expansion of the lamina propria, basal lymphoid aggregate, rectum with severe diffuse inflammation. Then oral mesalamine increased to 4 tabs/d (4.8 g/d), canasa qhs x 1 month.  Due to rectal pain and pressure her GI treated her for presumed anal fissure with NTG ointment (taking since 10/24/24) and no headaches and helped some with the pain and pressure.  She continues on lialda 4.8 g/d.      I corresponded with pt's oncologist and recommended 2 weeks ago to increase canasa [place] to BID.  In the past 2 weeks pt has only able to hold in AM dose 50% of the time but able to hold in evening dose every evening. Currently symptoms 10 trips to the toilet of which 50% are soft BMS  with BRB on stool and the false trips bloody clots and always blood when she wipes. 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  CT A/P 10/25/24 significant for distal sigmoid/rectal wall thickening. Takes mobic for 1 year for OA. On 24 stool calprotectin 728.    Prior Pertinent Surgeries:   None    Last pertinent Endoscopy/Imagin/24 colonoscopy:  patchy erythema in the left colon and rectum c/w ulcerative colitis vs Crohns' disease and active inflammation in the distal rectum with fairly normal appearing proximal rectum and then again moderate inflammatory changes in the distal sigmoid colon with normal ICV and TI.  Biopsies of right colon c/w florid melanosis coli, left colon with architectural distortion with plasma cellular expansion of the lamina propria, basal lymphoid aggregate, rectum with severe diffuse inflammation  10/25/24 CT A/P:  distal sigmoid/rectal wall thickening    Therapeutic Drug Monitoring Labs:  NA    Prior IBD Therapies:  None    Current IBD Therapies:  Lialda (mesalamine) 4.8 g/d    Vaccinations:  Patient instructed to email us immunization history so we can input  Flu shot: recommended yearly, UTD  COVID vaccine/booster:  per CDC recommendations, UTD  RSV: recommended   Tetanus (Tdap): recommended every 10 years   PCV 20: recommended, not sure   HPV: NA  Meningococcal: NA  Hepatitis B: will check immunity     Hepatitis A:  will check immunity     MMR (live vaccine): will check immunity          Chickenpox status/Varicella (live vaccine):  will check immunity     Shingrix: UTD    Review of Systems   Constitutional:  Negative for chills, fever and weight loss.   HENT:          No oral ulcers, dysphagia, oral thrush   Eyes:  Negative for blurred vision, pain and redness.   Respiratory:  Negative for cough and shortness of breath.    Cardiovascular:  Negative for chest pain.   Gastrointestinal:  Negative  for abdominal pain, heartburn, nausea and vomiting.   Genitourinary:  Negative for dysuria and hematuria.   Musculoskeletal:  Negative for back pain and joint pain.   Skin:  Negative for rash.   Psychiatric/Behavioral:  Negative for depression. The patient is not nervous/anxious and does not have insomnia.      Medical/Surgical History:    has a past medical history of Breast cancer, Chronic constipation, Osteoarthritis, and Ulcerative colitis.    has a past surgical history that includes Colonoscopy; Upper gastrointestinal endoscopy;  section; Repair of labrum of hip (Left); Tendon repair (Left); and Cervical laminectomy.     Family History:   family history is not on file.     Social History:    reports that she has never smoked. She has never used smokeless tobacco. She reports current alcohol use of about 1.0 standard drink of alcohol per week. She reports that she does not use drugs.     Current Medications:   Outpatient Medications Marked as Taking for the 24 encounter (Office Visit) with Alphonse Sumner MD   Medication Sig Dispense Refill    calcium carbonate (CALCIUM 500 ORAL) Take by mouth once daily.      cholecalciferol, vitamin D3, (VITAMIN D3) 125 mcg (5,000 unit) Tab Take 5,000 Units by mouth once daily.      ezetimibe (ZETIA) 10 mg tablet Take 10 mg by mouth once daily.      hydroCHLOROthiazide (HYDRODIURIL) 25 MG tablet Take 25 mg by mouth once daily.      levothyroxine (SYNTHROID) 112 MCG tablet Take 1 tablet by mouth once daily.      meloxicam (MOBIC) 15 MG tablet Take 15 mg by mouth once daily.      mesalamine (LIALDA) 1.2 gram TbEC Take 4.8 g by mouth once daily.      nitroGLYCERIN 0.1 mg/hr TD PT24 (NITRODUR) 0.1 mg/hr PT24 apply INSIDE THE rectum twice A DAY      potassium chloride SA (K-DUR,KLOR-CON M) 10 MEQ tablet Take 2 tablets (20 mEq total) by mouth once daily. for 365 doses 180 tablet 3    prochlorperazine (COMPAZINE) 10 MG tablet Take 10 mg by mouth every 12 (twelve)  "hours as needed.      venlafaxine (EFFEXOR-XR) 75 MG 24 hr capsule Take 225 mg by mouth once daily.      zoledronic acid (ZOMETA IV) Inject into the vein every 28 days.      ZYPREXA 5 mg tablet Take 10 mg by mouth once daily.      [DISCONTINUED] mesalamine (CANASA) 1000 MG Supp Place 500 mg rectally 2 (two) times daily.        Vital Signs:  /75 (BP Location: Right arm, Patient Position: Sitting)   Pulse 89   Temp 98.4 °F (36.9 °C) (Skin)   Ht 5' 6" (1.676 m)   Wt 92.5 kg (203 lb 14.8 oz)   LMP  (LMP Unknown)   SpO2 99%   BMI 32.91 kg/m²    Physical Exam  Cardiovascular:      Pulses: Normal pulses.      Heart sounds: Normal heart sounds.   Pulmonary:      Effort: Pulmonary effort is normal.      Breath sounds: Normal breath sounds.   Abdominal:      General: Bowel sounds are normal. There is no distension.      Palpations: Abdomen is soft.      Tenderness: There is no abdominal tenderness.         Labs: Reviewed  Lab Results   Component Value Date    CRP 16.1 (H) 11/14/2024    CALPROTECTIN 728.0 (H) 11/07/2024     Lab Results   Component Value Date    HEPBSAG Non-reactive 11/14/2024    HEPBCAB Non-reactive 11/14/2024     No results found for: "TBGOLDPLUS"  Lab Results   Component Value Date    GDKBLDCZ72DX 72 11/14/2024    YVOGQAHF31 1091 (H) 11/14/2024     Lab Results   Component Value Date    WBC 3.30 (L) 11/12/2024    HGB 10.9 (L) 11/12/2024    HCT 32.4 (L) 11/12/2024    MCV 98 11/12/2024     11/12/2024     Lab Results   Component Value Date    CREATININE 0.8 11/12/2024    ALBUMIN 3.5 11/12/2024    BILITOT 0.4 11/12/2024    ALKPHOS 85 11/12/2024    AST 25 11/12/2024    ALT 27 11/12/2024     Assessment/Plan:  Jacki Stone is a 59 y.o. 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     Patient is being managed by oncologist, Dr. Anderson for breast cancer. She started with tenesmus symptoms since 8/2024 with colonoscopy and imaging " primarily significant for rectum/sigmoid inflammation and has not seen improvement after 2 mos of higher dose oral lialda 4.8 g/d and 1 month of canasa suppositories. She has been on 2 weeks of canasa BID though only able to hold in AM dose about 50% of the time but is willing to give this more time to work. We will continue lialda 4.8 g/d.  Today we discussed trying to alter time of the day of her canasa and consider decreasing or holding her colon cleanse capsule that she takes for chronic constipation since her frequent bowel movements are on the days that she takes the colon cleanse capsules and likely working against her flare up and would also recommend low residue diet.  I don''t feel that she needs NTG ointment for presumed anal fissure due to rectal pain/discomfort. We will check on pt in 1 week and if she is not able to hold in suppositories and things not going in right direction will consider uceris foam.      # Ulcerative colitis (proctosigmoiditis):    - I had a long discussion with patient regarding epidemiology, potential etiologies, associations and triggers (avoiding NSAIDS, using antibiotics with caution,stress and smoking effects on disease state), diagnosis, management goals and treatment options   - note:  breast cancer, will coordinate with oncologist and keep this in mind when considering any IBD meds  - continue lialda (mesalamine) 4.8 g/d  - continue canasa suppositories IA BID- alter timing of AM supp and see if holding off on colon cleanse capsules will help with retention  - stop NTG ointment/cream  - pt to see if she can decrease dose or hold colon cleanse capsules  - stool calprotectin- kit given and timing to be determined based on symptomatic response  - smoking status: never smoked  - basic labs: CRP, HCV Ab, HIV, vitamin F64-rhocd  - drug monitoring labs: CBC/CMP yearly (10/2025, TPMT (not indicated, no plans for , TB quantiferon, Hep B testing (HBsAg, HBtotalcoreAb)    # Chronic  constipation:  - failed linzess, trulance- severe diarrhea  - Dr. Kline colon 14 day cleanse 2 capsules qod (cascara sagrada dark, pysllium husk, senna leafe, flaxseed powder, lactobicillus, aloe vera, licorice root, MCT oil)  - constipation feeling may be confused with tenesmus  - pt told to decrease or hold off colon cleanse capsules since this is affecting retention of canasa supp for UC flare    # IBD specific health maintenance:  CRC risk- sx 2019, <1/3 colon involved, average risk  Skin exam- recommend using sunblock/hats/sunprotective clothing and yearly skin exams  Osteopenia- calcium 1500 mg/d, zometa  Pap smear- normal 2021  Vitamin D- will check, on vit D3 5000 IU/d  Vaccines- no live vaccines, pt will email us her immunization records,will check immunity to hep A, B, varicella and MMR    Total time: 60 min    Follow up in about 7 weeks (around 1/2/2025) for in person.    Thank you again for sending Jacki Stone to see Dr. Alphonse Sumner today at the [facility]. Please don't hesitate to contact Dr. Sumner if there are any questions regarding this evaluation, or if you have any other patients with inflammatory bowel disease for whom you would like a consultation. You can reach Dr. Sumner at [phone] or by email at [email or web address]    Alphonse Sumner MD  Department of Gastroenterology  Medical Director, Inflammatory Bowel Disease

## 2024-11-12 NOTE — PROGRESS NOTES
[facility]   at [facility] Note:      Chief Complaint:   Encounter Diagnoses   Name Primary?    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 female who presents today for follow up for stage IV TNBC. She presents today for a follow up. She was diagnosed with ulcerative proctitis in 2019. Followed by Dr Marte in [place], [place]. She was on a mesalamine suppository x 2 weeks without relief. She continues to pass blood clots with gas but no increased stool. She is not established with a local gastro.  She has tried increasing mesalamine to bid per GI e consult recommendations, has not noticed relief from this.  She has a GI visit scheduled for this Thursday.  Otherwise, she is eating and drinking well.  She has lower abdomen cramping and rectal pressure, but not new pain.  No fever. No cough, sob, chest pain.  Energy level stabel in the last few weeks.  Has mild nausea that is controlled with compazine.  No vomiting.     Wants zometa on 12/4 because she says it makes her feel bad fro about 4 days and she does not want to get it the day before Thanksgiving    Oncology History   Patient with TNBC diagnosed in 2022 in [place], [place]   Routine screening ultrasound demonstrated 11mm mass in the L breast   Biopsy 9/30/22: IDC, grade 3;  "ER-/NV-/HER2 0; PDL1 CPS <10    NACT with weekly carboplatin/paclitaxel x 12  Bilateral mastectomy March 2022: residual IDC, 1.7mm; 0 LN+  Adjuvant AC x 4 completed 5/23/23    Routine PET imaging without new disease  Follow up June 2024 demonstrated "elevated tumor markers" with high  and LDH  PET 6/11/24: bilateral hilar and mediastinal LAD, 2.7cm KRIS mass  Bronchoscopy 6/18/24: metastatic carcinoma 4R/11L c/w breast carcinoma     Started Keytruda/Abraxane 6/20/24, dropped Keytruda given no PDL1  October imaging stable       Patient Active Problem List   Diagnosis    Malignant neoplasm of upper-outer quadrant of left breast in female, estrogen receptor negative    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    Secondary malignant neoplasm of mediastinal lymph node       Current Outpatient Medications   Medication Instructions    ezetimibe (ZETIA) 10 mg, Daily    hydroCHLOROthiazide (HYDRODIURIL) 25 mg, Daily    levothyroxine (SYNTHROID) 112 MCG tablet 1 tablet, Daily    meloxicam (MOBIC) 15 mg, Daily    mesalamine (CANASA) 500 mg, 2 times daily    mesalamine (LIALDA) 1.2 g, Daily    potassium chloride SA (K-DUR,KLOR-CON M) 10 MEQ tablet 20 mEq, Oral, Daily    venlafaxine (EFFEXOR-XR) 225 mg, Daily    ZyPREXA 10 mg, Daily       Review of Systems:   Review of Systems   Constitutional:  Positive for malaise/fatigue.        Mild fatigue, able to do adls   HENT: Negative.     Respiratory: Negative.  Negative for cough and shortness of breath.    Cardiovascular: Negative.  Negative for chest pain.   Gastrointestinal:  Positive for constipation, diarrhea and nausea. Negative for abdominal pain.   Genitourinary:  Negative for frequency.   Musculoskeletal: Negative.  Negative for back pain.   Skin:  Negative for rash.   Neurological: Negative.  Negative for headaches.   Psychiatric/Behavioral:  The patient is not nervous/anxious.    All other systems reviewed and are negative.    Physical Exam  Constitutional:  "      General: She is not in acute distress.     Appearance: Normal appearance. She is not ill-appearing.   HENT:      Head: Normocephalic and atraumatic.   Eyes:      Extraocular Movements: Extraocular movements intact.   Cardiovascular:      Rate and Rhythm: Normal rate and regular rhythm.      Pulses: Normal pulses.      Heart sounds: Normal heart sounds. No murmur heard.  Pulmonary:      Effort: Pulmonary effort is normal. No respiratory distress.      Breath sounds: Normal breath sounds.   Abdominal:      General: Abdomen is flat. Bowel sounds are normal. There is no distension.      Palpations: Abdomen is soft.      Tenderness: There is no abdominal tenderness.   Musculoskeletal:      Cervical back: Normal range of motion.   Lymphadenopathy:      Cervical: No cervical adenopathy.   Skin:     General: Skin is warm and dry.   Neurological:      General: No focal deficit present.      Mental Status: She is alert and oriented to person, place, and time.       Pertinent Labs & Imaging:  Pathology Results  (Last 30 days)      None            Recent Results (from the past 24 hours)   CBC W/ AUTO DIFFERENTIAL    Collection Time: 11/12/24  9:10 AM   Result Value Ref Range    WBC 3.30 (L) 3.90 - 12.70 K/uL    RBC 3.30 (L) 4.00 - 5.40 M/uL    Hemoglobin 10.9 (L) 12.0 - 16.0 g/dL    Hematocrit 32.4 (L) 37.0 - 48.5 %    MCV 98 82 - 98 fL    MCH 33.0 (H) 27.0 - 31.0 pg    MCHC 33.6 32.0 - 36.0 g/dL    RDW 15.3 (H) 11.5 - 14.5 %    Platelets 298 150 - 450 K/uL    MPV 8.0 (L) 9.2 - 12.9 fL    Immature Granulocytes 0.6 (H) 0.0 - 0.5 %    Gran # (ANC) 1.6 (L) 1.8 - 7.7 K/uL    Immature Grans (Abs) 0.02 0.00 - 0.04 K/uL    Lymph # 1.4 1.0 - 4.8 K/uL    Mono # 0.3 0.3 - 1.0 K/uL    Eos # 0.0 0.0 - 0.5 K/uL    Baso # 0.03 0.00 - 0.20 K/uL    nRBC 0 0 /100 WBC    Gran % 47.0 38.0 - 73.0 %    Lymph % 41.2 18.0 - 48.0 %    Mono % 10.3 4.0 - 15.0 %    Eosinophil % 0.0 0.0 - 8.0 %    Basophil % 0.9 0.0 - 1.9 %    Differential Method  Automated    CMP    Collection Time: 11/12/24  9:10 AM   Result Value Ref Range    Sodium 138 136 - 145 mmol/L    Potassium 3.2 (L) 3.5 - 5.1 mmol/L    Chloride 102 95 - 110 mmol/L    CO2 28 23 - 29 mmol/L    Glucose 91 70 - 110 mg/dL    BUN 9 6 - 20 mg/dL    Creatinine 0.8 0.5 - 1.4 mg/dL    Calcium 9.1 8.7 - 10.5 mg/dL    Total Protein 7.0 6.0 - 8.4 g/dL    Albumin 3.5 3.5 - 5.2 g/dL    Total Bilirubin 0.4 0.1 - 1.0 mg/dL    Alkaline Phosphatase 85 40 - 150 U/L    AST 25 10 - 40 U/L    ALT 27 10 - 44 U/L    eGFR >60.0 >60 mL/min/1.73 m^2    Anion Gap 8 8 - 16 mmol/L       Assessment & Plan:  1. Malignant neoplasm of upper-outer quadrant of left breast in female, estrogen receptor negative  2. 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 Secondary malignant neoplasm of mediastinal lymph node  4. Carcinoma of breast metastatic to bone, unspecified laterality    Patient with stage IV TNBC on abraxane  Previously receiving Keytruda/Abraxane but review of records show PDL1 CPS <10. No indication for Keytruda based on Keynote 355  Recommend weekly abraxane 3 on/ 1 off pending 2nd opinion on pathology   Reviewed PET October 2024 which shows stable size and improved SUV in index bone lesions. Artifact on imaging obscures lung nodule. Overall stable imaging  Will plan to continue 3 additional cycles and then repeat PET imaging for treatment assessment in mid-January 22C3 PDL1 testing still pending  Follow up 2 weeks for lab review  Ok for treatment this week     Zometa every 4 weeks (brings in dental clearance today)    5. Ulcerative proctitis with rectal bleeding  Continue mesalamine supp for now  Keep visit on Thursday with GI     6. Hypokalemia  Increase potassium to 20 meq daily    - potassium chloride SA (K-DUR,KLOR-CON M) 10 MEQ tablet; Take 2 tablets (20 mEq total) by mouth once daily. for 365 doses  Dispense: 180 tablet; Refill: 3      Route Chart for Scheduling    Med Onc Chart  Routing  Urgent    Follow up with physician 2 weeks and 6 weeks.   Follow up with LINDA 4 weeks.   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   noted on 7/16/2024  Associated diagnosis: Secondary malignant neoplasm of mediastinal lymph node   noted on 7/16/2024   Line of treatment: First Line  Treatment Goal: Palliative     Upcoming Treatment Dates - OP BREAST NAB-PACLITAXEL WEEKLY    11/13/2024       Chemotherapy       PACLitaxeL protein-bound (ABRAXANE) 100 mg/m2 = 210 mg in 42 mL infusion  11/27/2024       Chemotherapy       PACLitaxeL protein-bound (ABRAXANE) 100 mg/m2 = 210 mg in 42 mL infusion  12/4/2024       Chemotherapy       PACLitaxeL protein-bound (ABRAXANE) 100 mg/m2 = 210 mg in 42 mL infusion  12/11/2024       Chemotherapy       PACLitaxeL protein-bound (ABRAXANE) 100 mg/m2 = 210 mg in 42 mL infusion    Supportive Plan Information  OP ZOLEDRONIC ACID (ZOMETA) Q4W Renetta Anderson MD   Associated Diagnosis: Malignant neoplasm of upper-outer quadrant of left breast in female, estrogen receptor negative Stage IV rcTX, cNX, pM1, G3, ER-, CT-, HER2- noted on 7/16/2024   Line of treatment: Supportive Care   Treatment goal: Palliative     Upcoming Treatment Dates - OP ZOLEDRONIC ACID (ZOMETA) Q4W    12/4/2024       Medications       zoledronic acid (ZOMETA) 4 mg in 0.9% NaCl 100 mL IVPB  1/1/2025       Medications        zoledronic acid (ZOMETA) 4 mg in 0.9% NaCl 100 mL IVPB  1/29/2025       Medications       zoledronic acid (ZOMETA) 4 mg in 0.9% NaCl 100 mL IVPB  2/26/2025       Medications       zoledronic acid (ZOMETA) 4 mg in 0.9% NaCl 100 mL IVPB    MDM includes  :    - Acute or chronic illness or injury that poses a threat to life or bodily function  - Independent review and explanation of 3+ results from unique tests  - Discussion of management and ordering 3+ unique tests  - Extensive discussion of treatment and management  - Prescription drug management  - Drug therapy requiring intensive monitoring for toxicity    Jacquelyn Apodaca, NP   11/12/2024    Answers submitted by the patient for this visit:  Review of Systems Questionnaire (Submitted on 11/7/2024)  appetite change : No  unexpected weight change: No  mouth sores: No  visual disturbance: No  adenopathy: No

## 2024-11-13 ENCOUNTER — INFUSION (OUTPATIENT)
Dept: INFUSION THERAPY | Facility: HOSPITAL | Age: 59
End: 2024-11-13
Attending: INTERNAL MEDICINE
Payer: COMMERCIAL

## 2024-11-13 VITALS — WEIGHT: 204.38 LBS | BODY MASS INDEX: 32.99 KG/M2

## 2024-11-13 DIAGNOSIS — C50.412 MALIGNANT NEOPLASM OF UPPER-OUTER QUADRANT OF LEFT BREAST IN FEMALE, ESTROGEN RECEPTOR NEGATIVE: Primary | ICD-10-CM

## 2024-11-13 DIAGNOSIS — C78.02 SECONDARY MALIGNANT NEOPLASM OF HILUS OF LEFT LUNG: ICD-10-CM

## 2024-11-13 DIAGNOSIS — C77.1 SECONDARY MALIGNANT NEOPLASM OF MEDIASTINAL LYMPH NODE: ICD-10-CM

## 2024-11-13 DIAGNOSIS — Z17.1 MALIGNANT NEOPLASM OF UPPER-OUTER QUADRANT OF LEFT BREAST IN FEMALE, ESTROGEN RECEPTOR NEGATIVE: Primary | ICD-10-CM

## 2024-11-13 PROCEDURE — 63600175 PHARM REV CODE 636 W HCPCS: Performed by: INTERNAL MEDICINE

## 2024-11-13 PROCEDURE — 96413 CHEMO IV INFUSION 1 HR: CPT

## 2024-11-13 RX ORDER — HEPARIN 100 UNIT/ML
500 SYRINGE INTRAVENOUS
Status: DISCONTINUED | OUTPATIENT
Start: 2024-11-13 | End: 2024-11-13 | Stop reason: HOSPADM

## 2024-11-13 RX ADMIN — PACLITAXEL 200 MG: 100 INJECTION, POWDER, LYOPHILIZED, FOR SUSPENSION INTRAVENOUS at 10:11

## 2024-11-13 RX ADMIN — HEPARIN 500 UNITS: 100 SYRINGE at 11:11

## 2024-11-13 NOTE — PLAN OF CARE
Pt arrived to unit ambulatory. Pt reorts no new or worsening symptoms at this time. Pt received abraxane with no S&S of complications. Pt next appointment confirmed and Pt discharged off unit in NAD.

## 2024-11-14 ENCOUNTER — LAB VISIT (OUTPATIENT)
Dept: LAB | Facility: HOSPITAL | Age: 59
End: 2024-11-14
Attending: INTERNAL MEDICINE
Payer: COMMERCIAL

## 2024-11-14 ENCOUNTER — OFFICE VISIT (OUTPATIENT)
Dept: GASTROENTEROLOGY | Facility: CLINIC | Age: 59
End: 2024-11-14
Payer: COMMERCIAL

## 2024-11-14 VITALS
DIASTOLIC BLOOD PRESSURE: 75 MMHG | OXYGEN SATURATION: 99 % | WEIGHT: 203.94 LBS | SYSTOLIC BLOOD PRESSURE: 118 MMHG | BODY MASS INDEX: 32.78 KG/M2 | HEART RATE: 89 BPM | TEMPERATURE: 98 F | HEIGHT: 66 IN

## 2024-11-14 DIAGNOSIS — C50.412 MALIGNANT NEOPLASM OF UPPER-OUTER QUADRANT OF LEFT BREAST IN FEMALE, ESTROGEN RECEPTOR NEGATIVE: ICD-10-CM

## 2024-11-14 DIAGNOSIS — Z17.1 MALIGNANT NEOPLASM OF UPPER-OUTER QUADRANT OF LEFT BREAST IN FEMALE, ESTROGEN RECEPTOR NEGATIVE: ICD-10-CM

## 2024-11-14 DIAGNOSIS — K59.09 CHRONIC CONSTIPATION: ICD-10-CM

## 2024-11-14 DIAGNOSIS — K51.311 ULCERATIVE RECTOSIGMOIDITIS WITH RECTAL BLEEDING: Primary | ICD-10-CM

## 2024-11-14 DIAGNOSIS — K51.311 ULCERATIVE RECTOSIGMOIDITIS WITH RECTAL BLEEDING: ICD-10-CM

## 2024-11-14 LAB
25(OH)D3+25(OH)D2 SERPL-MCNC: 72 NG/ML (ref 30–96)
CRP SERPL-MCNC: 16.1 MG/L (ref 0–8.2)
HAV IGG SER QL IA: NORMAL
HBV CORE AB SERPL QL IA: NORMAL
HBV SURFACE AG SERPL QL IA: NORMAL
HCV AB SERPL QL IA: NORMAL
HIV 1+2 AB+HIV1 P24 AG SERPL QL IA: NORMAL
VIT B12 SERPL-MCNC: 1091 PG/ML (ref 210–950)

## 2024-11-14 PROCEDURE — 86765 RUBEOLA ANTIBODY: CPT | Performed by: INTERNAL MEDICINE

## 2024-11-14 PROCEDURE — G2211 COMPLEX E/M VISIT ADD ON: HCPCS | Mod: S$GLB,,, | Performed by: INTERNAL MEDICINE

## 2024-11-14 PROCEDURE — 3008F BODY MASS INDEX DOCD: CPT | Mod: CPTII,S$GLB,, | Performed by: INTERNAL MEDICINE

## 2024-11-14 PROCEDURE — 86480 TB TEST CELL IMMUN MEASURE: CPT | Performed by: INTERNAL MEDICINE

## 2024-11-14 PROCEDURE — 3044F HG A1C LEVEL LT 7.0%: CPT | Mod: CPTII,S$GLB,, | Performed by: INTERNAL MEDICINE

## 2024-11-14 PROCEDURE — 1160F RVW MEDS BY RX/DR IN RCRD: CPT | Mod: CPTII,S$GLB,, | Performed by: INTERNAL MEDICINE

## 2024-11-14 PROCEDURE — 86706 HEP B SURFACE ANTIBODY: CPT | Performed by: INTERNAL MEDICINE

## 2024-11-14 PROCEDURE — 86735 MUMPS ANTIBODY: CPT | Performed by: INTERNAL MEDICINE

## 2024-11-14 PROCEDURE — 3078F DIAST BP <80 MM HG: CPT | Mod: CPTII,S$GLB,, | Performed by: INTERNAL MEDICINE

## 2024-11-14 PROCEDURE — 86762 RUBELLA ANTIBODY: CPT | Performed by: INTERNAL MEDICINE

## 2024-11-14 PROCEDURE — 82306 VITAMIN D 25 HYDROXY: CPT | Performed by: INTERNAL MEDICINE

## 2024-11-14 PROCEDURE — 99205 OFFICE O/P NEW HI 60 MIN: CPT | Mod: S$GLB,,, | Performed by: INTERNAL MEDICINE

## 2024-11-14 PROCEDURE — 86787 VARICELLA-ZOSTER ANTIBODY: CPT | Performed by: INTERNAL MEDICINE

## 2024-11-14 PROCEDURE — 3074F SYST BP LT 130 MM HG: CPT | Mod: CPTII,S$GLB,, | Performed by: INTERNAL MEDICINE

## 2024-11-14 PROCEDURE — 87340 HEPATITIS B SURFACE AG IA: CPT | Performed by: INTERNAL MEDICINE

## 2024-11-14 PROCEDURE — 86140 C-REACTIVE PROTEIN: CPT | Performed by: INTERNAL MEDICINE

## 2024-11-14 PROCEDURE — 86803 HEPATITIS C AB TEST: CPT | Performed by: INTERNAL MEDICINE

## 2024-11-14 PROCEDURE — 1159F MED LIST DOCD IN RCRD: CPT | Mod: CPTII,S$GLB,, | Performed by: INTERNAL MEDICINE

## 2024-11-14 PROCEDURE — 87389 HIV-1 AG W/HIV-1&-2 AB AG IA: CPT | Performed by: INTERNAL MEDICINE

## 2024-11-14 PROCEDURE — 86790 VIRUS ANTIBODY NOS: CPT | Performed by: INTERNAL MEDICINE

## 2024-11-14 PROCEDURE — 82607 VITAMIN B-12: CPT | Performed by: INTERNAL MEDICINE

## 2024-11-14 PROCEDURE — 86704 HEP B CORE ANTIBODY TOTAL: CPT | Performed by: INTERNAL MEDICINE

## 2024-11-14 RX ORDER — NITROGLYCERIN 20 MG/1
PATCH TRANSDERMAL
COMMUNITY
Start: 2024-10-22

## 2024-11-14 RX ORDER — MESALAMINE 1.2 G/1
4.8 TABLET, DELAYED RELEASE ORAL DAILY
Qty: 60 TABLET | Refills: 5 | Status: SHIPPED | OUTPATIENT
Start: 2024-11-14

## 2024-11-14 RX ORDER — ACETAMINOPHEN 500 MG
5000 TABLET ORAL DAILY
COMMUNITY

## 2024-11-14 RX ORDER — PROCHLORPERAZINE MALEATE 10 MG
10 TABLET ORAL EVERY 12 HOURS PRN
COMMUNITY
Start: 2024-06-20

## 2024-11-14 RX ORDER — MESALAMINE 1000 MG/1
1000 SUPPOSITORY RECTAL 2 TIMES DAILY
Qty: 60 SUPPOSITORY | Refills: 3 | Status: SHIPPED | OUTPATIENT
Start: 2024-11-14

## 2024-11-14 NOTE — PATIENT INSTRUCTIONS
- please get your immunization records and email us a copy so we can update your records  - labs today  - will give you a stool kit and let you know when to turn that in  - continue mesalamine 4 tabs daily  - structure your suppositories so you take canasa twice a day and try to hold in supp for longer if able  - we will check in with you in 1 week to see how its going and then after 2 weeks if able to hold in to see if you are better and if not then will proceed with considering uceris foam as next step in treatment  - stop NTG ointment/cream  - see if you can lay off the colon cleanse

## 2024-11-14 NOTE — Clinical Note
Kitty Check with pt in 1 week (11/21) to see if able to hold canasa supp and how she is taking colon cleanse to decide to continue additional time on canasa BID or if we need to change to uceris foam.

## 2024-11-15 LAB
GAMMA INTERFERON BACKGROUND BLD IA-ACNC: 0.14 IU/ML
M TB IFN-G CD4+ BCKGRND COR BLD-ACNC: -0.03 IU/ML
M TB IFN-G CD4+ BCKGRND COR BLD-ACNC: -0.03 IU/ML
MITOGEN IGNF BCKGRD COR BLD-ACNC: 9.86 IU/ML
MUMPS IGG INTERPRETATION: POSITIVE
MUMPS IGG SCREEN: 88.1 AU/ML
RUBEOLA IGG ANTIBODY: >300 AU/ML
RUBEOLA INTERPRETATION: POSITIVE
RUBV IGG SER-ACNC: >400 IU/ML
RUBV IGG SER-IMP: REACTIVE
TB GOLD PLUS: NEGATIVE
VARICELLA INTERPRETATION: POSITIVE
VARICELLA ZOSTER IGG: 28.8 S/CO

## 2024-11-18 LAB
HBV SURFACE AB SER QL IA: NEGATIVE
HBV SURFACE AB SERPL IA-ACNC: <3 MIU/ML

## 2024-11-21 ENCOUNTER — TELEPHONE (OUTPATIENT)
Dept: GASTROENTEROLOGY | Facility: CLINIC | Age: 59
End: 2024-11-21
Payer: COMMERCIAL

## 2024-11-21 NOTE — TELEPHONE ENCOUNTER
Alphonse Sumner MD [facility] Staff  Review my recs regarding colon cleanse and to back off if able  Continue canasa twice a day for another week and awaiting recent stool calprotectin  Repeat stool calprotectin after 2 full weeks of canasa    SS

## 2024-11-21 NOTE — TELEPHONE ENCOUNTER
Per PM:  IBD, patchy erythema in the left colon and rectum c/w ulcerative colitis vs Crohns' disease    IBD meds:  - Lialda 4 tabs daily: confirmed  - Canasa twice daily: confirmed  - colon cleanse: sometimes forgets    1 wk f/u Canasa tolerance    - she is able to hold both doses of Canasa daily  - BM/d: 5-6 if she takes the colon cleanse, 0 if she doesn't take the cleanse, soft, brown  - blood: on stool,   - mucous: yes  - false BR trips/d: 6-10 with or without colon cleanse, passes blood clots, dime size is largest, passes frothy blood, and/or gas  - Denies: fever, N/V, noc BM, and pain  - OV: 1/3/25    Dr. Sumner will be updated.

## 2024-11-21 NOTE — TELEPHONE ENCOUNTER
Spoke with Jacki:   - reviewed Dr. Sumner's recommendation to try to reduce the use of the colon cleanse to see if this reduces her BR trips, pt is afraid if she backs off she won't go, adv it is ok to not have a BM up to 3 days  - continue Canasa BID and turn in stool sample in 1 wk  - next week is Thanksgiving, will bring in Tu or Wed  - she states understanding and agrees to this plan

## 2024-11-21 NOTE — TELEPHONE ENCOUNTER
----- Message from CHEMA Gibson sent at 11/14/2024 12:22 PM CST -----  Kitty   Check with pt in 1 week (11/21) to see if able to hold canasa supp and how she is taking colon cleanse to decide to continue additional time on canasa BID or if we need to change to uceris foam.

## 2024-11-25 ENCOUNTER — OFFICE VISIT (OUTPATIENT)
Dept: HEMATOLOGY/ONCOLOGY | Facility: CLINIC | Age: 59
End: 2024-11-25
Payer: COMMERCIAL

## 2024-11-25 ENCOUNTER — LAB VISIT (OUTPATIENT)
Dept: LAB | Facility: HOSPITAL | Age: 59
End: 2024-11-25
Attending: INTERNAL MEDICINE
Payer: COMMERCIAL

## 2024-11-25 VITALS
HEART RATE: 69 BPM | OXYGEN SATURATION: 100 % | DIASTOLIC BLOOD PRESSURE: 70 MMHG | WEIGHT: 199 LBS | HEIGHT: 66 IN | SYSTOLIC BLOOD PRESSURE: 117 MMHG | BODY MASS INDEX: 31.98 KG/M2 | RESPIRATION RATE: 18 BRPM | TEMPERATURE: 97 F

## 2024-11-25 DIAGNOSIS — Z17.1 MALIGNANT NEOPLASM OF UPPER-OUTER QUADRANT OF LEFT BREAST IN FEMALE, ESTROGEN RECEPTOR NEGATIVE: Primary | ICD-10-CM

## 2024-11-25 DIAGNOSIS — K51.211 ULCERATIVE PROCTITIS WITH RECTAL BLEEDING: ICD-10-CM

## 2024-11-25 DIAGNOSIS — C79.51 CARCINOMA OF BREAST METASTATIC TO BONE, UNSPECIFIED LATERALITY: ICD-10-CM

## 2024-11-25 DIAGNOSIS — C50.919 CARCINOMA OF BREAST METASTATIC TO BONE, UNSPECIFIED LATERALITY: ICD-10-CM

## 2024-11-25 DIAGNOSIS — Z17.1 MALIGNANT NEOPLASM OF UPPER-OUTER QUADRANT OF LEFT BREAST IN FEMALE, ESTROGEN RECEPTOR NEGATIVE: ICD-10-CM

## 2024-11-25 DIAGNOSIS — C50.412 MALIGNANT NEOPLASM OF UPPER-OUTER QUADRANT OF LEFT BREAST IN FEMALE, ESTROGEN RECEPTOR NEGATIVE: Primary | ICD-10-CM

## 2024-11-25 DIAGNOSIS — C77.1 SECONDARY MALIGNANT NEOPLASM OF MEDIASTINAL LYMPH NODE: ICD-10-CM

## 2024-11-25 DIAGNOSIS — C78.02 SECONDARY MALIGNANT NEOPLASM OF HILUS OF LEFT LUNG: ICD-10-CM

## 2024-11-25 DIAGNOSIS — C50.412 MALIGNANT NEOPLASM OF UPPER-OUTER QUADRANT OF LEFT BREAST IN FEMALE, ESTROGEN RECEPTOR NEGATIVE: ICD-10-CM

## 2024-11-25 LAB
ALBUMIN SERPL BCP-MCNC: 3.4 G/DL (ref 3.5–5.2)
ALP SERPL-CCNC: 87 U/L (ref 40–150)
ALT SERPL W/O P-5'-P-CCNC: 25 U/L (ref 10–44)
ANION GAP SERPL CALC-SCNC: 7 MMOL/L (ref 8–16)
AST SERPL-CCNC: 25 U/L (ref 10–40)
BASOPHILS # BLD AUTO: 0.03 K/UL (ref 0–0.2)
BASOPHILS NFR BLD: 0.7 % (ref 0–1.9)
BILIRUB SERPL-MCNC: 0.3 MG/DL (ref 0.1–1)
BUN SERPL-MCNC: 9 MG/DL (ref 6–20)
CALCIUM SERPL-MCNC: 9.1 MG/DL (ref 8.7–10.5)
CHLORIDE SERPL-SCNC: 100 MMOL/L (ref 95–110)
CO2 SERPL-SCNC: 28 MMOL/L (ref 23–29)
CREAT SERPL-MCNC: 0.8 MG/DL (ref 0.5–1.4)
DIFFERENTIAL METHOD BLD: ABNORMAL
EOSINOPHIL # BLD AUTO: 0 K/UL (ref 0–0.5)
EOSINOPHIL NFR BLD: 0 % (ref 0–8)
ERYTHROCYTE [DISTWIDTH] IN BLOOD BY AUTOMATED COUNT: 15.4 % (ref 11.5–14.5)
EST. GFR  (NO RACE VARIABLE): >60 ML/MIN/1.73 M^2
GLUCOSE SERPL-MCNC: 112 MG/DL (ref 70–110)
HCT VFR BLD AUTO: 32.9 % (ref 37–48.5)
HGB BLD-MCNC: 11.1 G/DL (ref 12–16)
IMM GRANULOCYTES # BLD AUTO: 0.03 K/UL (ref 0–0.04)
IMM GRANULOCYTES NFR BLD AUTO: 0.7 % (ref 0–0.5)
LYMPHOCYTES # BLD AUTO: 1.5 K/UL (ref 1–4.8)
LYMPHOCYTES NFR BLD: 32.5 % (ref 18–48)
MCH RBC QN AUTO: 32.9 PG (ref 27–31)
MCHC RBC AUTO-ENTMCNC: 33.7 G/DL (ref 32–36)
MCV RBC AUTO: 98 FL (ref 82–98)
MONOCYTES # BLD AUTO: 0.7 K/UL (ref 0.3–1)
MONOCYTES NFR BLD: 15 % (ref 4–15)
NEUTROPHILS # BLD AUTO: 2.3 K/UL (ref 1.8–7.7)
NEUTROPHILS NFR BLD: 51.1 % (ref 38–73)
NRBC BLD-RTO: 0 /100 WBC
PLATELET # BLD AUTO: 259 K/UL (ref 150–450)
PMV BLD AUTO: 8 FL (ref 9.2–12.9)
POTASSIUM SERPL-SCNC: 3.8 MMOL/L (ref 3.5–5.1)
PROT SERPL-MCNC: 6.8 G/DL (ref 6–8.4)
RBC # BLD AUTO: 3.37 M/UL (ref 4–5.4)
SODIUM SERPL-SCNC: 135 MMOL/L (ref 136–145)
WBC # BLD AUTO: 4.53 K/UL (ref 3.9–12.7)

## 2024-11-25 PROCEDURE — 3008F BODY MASS INDEX DOCD: CPT | Mod: CPTII,S$GLB,, | Performed by: INTERNAL MEDICINE

## 2024-11-25 PROCEDURE — 3044F HG A1C LEVEL LT 7.0%: CPT | Mod: CPTII,S$GLB,, | Performed by: INTERNAL MEDICINE

## 2024-11-25 PROCEDURE — 85025 COMPLETE CBC W/AUTO DIFF WBC: CPT | Performed by: INTERNAL MEDICINE

## 2024-11-25 PROCEDURE — 3078F DIAST BP <80 MM HG: CPT | Mod: CPTII,S$GLB,, | Performed by: INTERNAL MEDICINE

## 2024-11-25 PROCEDURE — 99999 PR PBB SHADOW E&M-EST. PATIENT-LVL IV: CPT | Mod: PBBFAC,,, | Performed by: INTERNAL MEDICINE

## 2024-11-25 PROCEDURE — 3074F SYST BP LT 130 MM HG: CPT | Mod: CPTII,S$GLB,, | Performed by: INTERNAL MEDICINE

## 2024-11-25 PROCEDURE — 36415 COLL VENOUS BLD VENIPUNCTURE: CPT | Performed by: INTERNAL MEDICINE

## 2024-11-25 PROCEDURE — 1159F MED LIST DOCD IN RCRD: CPT | Mod: CPTII,S$GLB,, | Performed by: INTERNAL MEDICINE

## 2024-11-25 PROCEDURE — 99215 OFFICE O/P EST HI 40 MIN: CPT | Mod: S$GLB,,, | Performed by: INTERNAL MEDICINE

## 2024-11-25 PROCEDURE — 80053 COMPREHEN METABOLIC PANEL: CPT | Performed by: INTERNAL MEDICINE

## 2024-11-25 PROCEDURE — G2211 COMPLEX E/M VISIT ADD ON: HCPCS | Mod: S$GLB,,, | Performed by: INTERNAL MEDICINE

## 2024-11-25 NOTE — PROGRESS NOTES
"  [facility]/ [facility]   at [facility] Note:      Chief Complaint:   Encounter Diagnoses   Name Primary?    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 female who presents today for follow up for stage IV TNBC. She presents today for a follow up for cycle #6 day 1. She has been doing much better with UC. She was seen by Dr Sumner with treatment plan. She has follow up scheduled January 2025  Doing well on abraxane. She has minimal complaints   Zometa causes fatigue    Oncology History   Patient with TNBC diagnosed in 2022 in [place], [place]   Routine screening ultrasound demonstrated 11mm mass in the L breast   Biopsy 9/30/22: IDC, grade 3; ER-/IN-/HER2 0; PDL1 CPS <10    NACT with weekly carboplatin/paclitaxel x 12  Bilateral mastectomy March 2022: residual IDC, 1.7mm; 0 LN+  Adjuvant AC x 4 completed 5/23/23    Routine PET imaging without new disease  Follow up June 2024 demonstrated "elevated tumor markers" with high  and LDH  PET 6/11/24: bilateral hilar and mediastinal LAD, 2.7cm KRIS mass  Bronchoscopy 6/18/24: metastatic carcinoma 4R/11L c/w breast carcinoma     Started Keytruda/Abraxane 6/20/24, dropped Keytruda given no PDL1  Repeat 22C3 showed CPS > 10   October imaging stable     Of note: diagnosed UC 2019 in [place]     Patient Active Problem " List   Diagnosis    Malignant neoplasm of upper-outer quadrant of left breast in female, estrogen receptor negative    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    Secondary malignant neoplasm of mediastinal lymph node    Ulcerative rectosigmoiditis with rectal bleeding    Chronic constipation       Current Outpatient Medications   Medication Instructions    calcium carbonate (CALCIUM 500 ORAL) Daily    cholecalciferol (vitamin D3) (VITAMIN D3) 5,000 Units, Daily    ezetimibe (ZETIA) 10 mg, Daily    hydroCHLOROthiazide (HYDRODIURIL) 25 mg, Daily    levothyroxine (SYNTHROID) 112 MCG tablet 1 tablet, Daily    meloxicam (MOBIC) 15 mg, Daily    mesalamine (CANASA) 1,000 mg, Rectal, 2 times daily    mesalamine (LIALDA) 4.8 g, Oral, Daily    nitroGLYCERIN 0.1 mg/hr TD PT24 (NITRODUR) 0.1 mg/hr PT24 apply INSIDE THE rectum twice A DAY    potassium chloride SA (K-DUR,KLOR-CON M) 10 MEQ tablet 20 mEq, Oral, Daily    prochlorperazine (COMPAZINE) 10 mg, Every 12 hours PRN    venlafaxine (EFFEXOR-XR) 225 mg, Daily    zoledronic acid (ZOMETA IV) Every 28 days    ZyPREXA 10 mg, Daily       Review of Systems:   Review of Systems   Constitutional:  Positive for malaise/fatigue.        Mild fatigue, able to do adls   HENT: Negative.     Respiratory: Negative.  Negative for cough and shortness of breath.    Cardiovascular: Negative.  Negative for chest pain.   Gastrointestinal:  Positive for constipation, diarrhea and nausea. Negative for abdominal pain.   Genitourinary:  Negative for frequency.   Musculoskeletal: Negative.  Negative for back pain.   Skin:  Negative for rash.   Neurological: Negative.  Negative for headaches.   Psychiatric/Behavioral:  The patient is not nervous/anxious.    All other systems reviewed and are negative.    Vitals:    11/25/24 0833   BP: 117/70   Pulse: 69   Resp: 18   Temp: 97 °F (36.1 °C)         Pertinent Labs & Imaging:  Pathology Results  (Last 30 days)      None            Recent Results  (from the past 24 hours)   CBC W/ AUTO DIFFERENTIAL    Collection Time: 11/25/24  8:15 AM   Result Value Ref Range    WBC 4.53 3.90 - 12.70 K/uL    RBC 3.37 (L) 4.00 - 5.40 M/uL    Hemoglobin 11.1 (L) 12.0 - 16.0 g/dL    Hematocrit 32.9 (L) 37.0 - 48.5 %    MCV 98 82 - 98 fL    MCH 32.9 (H) 27.0 - 31.0 pg    MCHC 33.7 32.0 - 36.0 g/dL    RDW 15.4 (H) 11.5 - 14.5 %    Platelets 259 150 - 450 K/uL    MPV 8.0 (L) 9.2 - 12.9 fL    Immature Granulocytes 0.7 (H) 0.0 - 0.5 %    Gran # (ANC) 2.3 1.8 - 7.7 K/uL    Immature Grans (Abs) 0.03 0.00 - 0.04 K/uL    Lymph # 1.5 1.0 - 4.8 K/uL    Mono # 0.7 0.3 - 1.0 K/uL    Eos # 0.0 0.0 - 0.5 K/uL    Baso # 0.03 0.00 - 0.20 K/uL    nRBC 0 0 /100 WBC    Gran % 51.1 38.0 - 73.0 %    Lymph % 32.5 18.0 - 48.0 %    Mono % 15.0 4.0 - 15.0 %    Eosinophil % 0.0 0.0 - 8.0 %    Basophil % 0.7 0.0 - 1.9 %    Differential Method Automated    CMP    Collection Time: 11/25/24  8:15 AM   Result Value Ref Range    Sodium 135 (L) 136 - 145 mmol/L    Potassium 3.8 3.5 - 5.1 mmol/L    Chloride 100 95 - 110 mmol/L    CO2 28 23 - 29 mmol/L    Glucose 112 (H) 70 - 110 mg/dL    BUN 9 6 - 20 mg/dL    Creatinine 0.8 0.5 - 1.4 mg/dL    Calcium 9.1 8.7 - 10.5 mg/dL    Total Protein 6.8 6.0 - 8.4 g/dL    Albumin 3.4 (L) 3.5 - 5.2 g/dL    Total Bilirubin 0.3 0.1 - 1.0 mg/dL    Alkaline Phosphatase 87 40 - 150 U/L    AST 25 10 - 40 U/L    ALT 25 10 - 44 U/L    eGFR >60.0 >60 mL/min/1.73 m^2    Anion Gap 7 (L) 8 - 16 mmol/L         Assessment & Plan:  1. Malignant neoplasm of upper-outer quadrant of left breast in female, estrogen receptor negative  - NM PET CT FDG Skull Base to Mid Thigh; Future    2. 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 Secondary malignant neoplasm of mediastinal lymph node  - NM PET CT FDG Skull Base to Mid Thigh; Future    4. Carcinoma of breast metastatic to bone, unspecified laterality    5. Ulcerative proctitis with  rectal bleeding      Patient with stage IV TNBC on abraxane  Previously receiving Keytruda/Abraxane but review of records show PDL1 CPS <10 on lung assay  While pending 22C3 testing, continued weekly abraxane 3 on/ 1 off. Repeat 22C3 testing shows CPS >10.   Repeat imaging October 2024 shows stable size and improved SUV in index bone lesions. Artifact on imaging obscures lung nodule.   Worsening ulcerative proctitis, which precedes cancer diagnosis. Seen by Dr Sumner with recommendations     Will plan to continue 3 additional cycles and then repeat PET imaging for treatment assessment in mid-January (1/13/24)  If POD, plan to add in Keytruda to see if there is improvement     Zometa every 4 weeks        Route Chart for Scheduling    Med Onc Chart Routing      Follow up with physician 4 weeks. please schedule for cycle 7 day 1   Follow up with LINDA 6 weeks. 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   noted on 7/16/2024  Associated diagnosis: Secondary malignant neoplasm of mediastinal lymph node   noted on 7/16/2024   Line of treatment: First Line  Treatment Goal: Palliative     Upcoming Treatment Dates - OP BREAST NAB-PACLITAXEL WEEKLY    11/27/2024       Chemotherapy       PACLitaxeL protein-bound (ABRAXANE) 100 mg/m2 = 210 mg in 42 mL infusion  12/4/2024       Chemotherapy       PACLitaxeL protein-bound (ABRAXANE) 100 mg/m2 = 210 mg in 42 mL infusion  12/11/2024       Chemotherapy       PACLitaxeL  protein-bound (ABRAXANE) 100 mg/m2 = 210 mg in 42 mL infusion  12/25/2024       Chemotherapy       PACLitaxeL protein-bound (ABRAXANE) 100 mg/m2 = 210 mg in 42 mL infusion    Supportive Plan Information  OP ZOLEDRONIC ACID (ZOMETA) Q4W Renetta Anderson MD   Associated Diagnosis: Malignant neoplasm of upper-outer quadrant of left breast in female, estrogen receptor negative Stage IV rcTX, cNX, pM1, G3, ER-, [place]-, HER2- noted on 7/16/2024   Line of treatment: Supportive Care   Treatment goal: Palliative     Upcoming Treatment Dates - OP ZOLEDRONIC ACID (ZOMETA) Q4W    12/4/2024       Medications       zoledronic acid (ZOMETA) 4 mg in 0.9% NaCl 100 mL IVPB  1/1/2025       Medications       zoledronic acid (ZOMETA) 4 mg in 0.9% NaCl 100 mL IVPB  1/29/2025       Medications       zoledronic acid (ZOMETA) 4 mg in 0.9% NaCl 100 mL IVPB  2/26/2025       Medications       zoledronic acid (ZOMETA) 4 mg in 0.9% NaCl 100 mL IVPB    MDM includes  :    - Acute or chronic illness or injury that poses a threat to life or bodily function  - Independent review and explanation of 3+ results from unique tests  - Discussion of management and ordering 3+ unique tests  - Extensive discussion of treatment and management  - Prescription drug management  - Drug therapy requiring intensive monitoring for toxicity    Renetta Anderson MD   11/25/2024

## 2024-11-26 ENCOUNTER — LAB VISIT (OUTPATIENT)
Dept: LAB | Facility: HOSPITAL | Age: 59
End: 2024-11-26
Attending: INTERNAL MEDICINE
Payer: COMMERCIAL

## 2024-11-26 DIAGNOSIS — K51.311 ULCERATIVE RECTOSIGMOIDITIS WITH RECTAL BLEEDING: ICD-10-CM

## 2024-11-26 PROCEDURE — 83993 ASSAY FOR CALPROTECTIN FECAL: CPT | Performed by: INTERNAL MEDICINE

## 2024-11-26 RX ORDER — HEPARIN 100 UNIT/ML
500 SYRINGE INTRAVENOUS
OUTPATIENT
Start: 2024-12-11

## 2024-11-26 RX ORDER — DIPHENHYDRAMINE HYDROCHLORIDE 50 MG/ML
50 INJECTION INTRAMUSCULAR; INTRAVENOUS ONCE AS NEEDED
OUTPATIENT
Start: 2024-12-11

## 2024-11-26 RX ORDER — EPINEPHRINE 0.3 MG/.3ML
0.3 INJECTION SUBCUTANEOUS ONCE AS NEEDED
OUTPATIENT
Start: 2024-12-11

## 2024-11-26 RX ORDER — PROCHLORPERAZINE EDISYLATE 5 MG/ML
10 INJECTION INTRAMUSCULAR; INTRAVENOUS ONCE AS NEEDED
Start: 2024-12-04

## 2024-11-26 RX ORDER — EPINEPHRINE 0.3 MG/.3ML
0.3 INJECTION SUBCUTANEOUS ONCE AS NEEDED
Status: CANCELLED | OUTPATIENT
Start: 2024-11-27

## 2024-11-26 RX ORDER — DIPHENHYDRAMINE HYDROCHLORIDE 50 MG/ML
50 INJECTION INTRAMUSCULAR; INTRAVENOUS ONCE AS NEEDED
OUTPATIENT
Start: 2024-12-04

## 2024-11-26 RX ORDER — DIPHENHYDRAMINE HYDROCHLORIDE 50 MG/ML
50 INJECTION INTRAMUSCULAR; INTRAVENOUS ONCE AS NEEDED
Status: CANCELLED | OUTPATIENT
Start: 2024-11-27

## 2024-11-26 RX ORDER — PROCHLORPERAZINE EDISYLATE 5 MG/ML
10 INJECTION INTRAMUSCULAR; INTRAVENOUS ONCE AS NEEDED
Start: 2024-12-11

## 2024-11-26 RX ORDER — HEPARIN 100 UNIT/ML
500 SYRINGE INTRAVENOUS
OUTPATIENT
Start: 2024-12-04

## 2024-11-26 RX ORDER — SODIUM CHLORIDE 0.9 % (FLUSH) 0.9 %
10 SYRINGE (ML) INJECTION
OUTPATIENT
Start: 2024-12-04

## 2024-11-26 RX ORDER — SODIUM CHLORIDE 0.9 % (FLUSH) 0.9 %
10 SYRINGE (ML) INJECTION
Status: CANCELLED | OUTPATIENT
Start: 2024-11-27

## 2024-11-26 RX ORDER — HEPARIN 100 UNIT/ML
500 SYRINGE INTRAVENOUS
Status: CANCELLED | OUTPATIENT
Start: 2024-11-27

## 2024-11-26 RX ORDER — PROCHLORPERAZINE EDISYLATE 5 MG/ML
10 INJECTION INTRAMUSCULAR; INTRAVENOUS ONCE AS NEEDED
Status: CANCELLED
Start: 2024-11-27

## 2024-11-26 RX ORDER — SODIUM CHLORIDE 0.9 % (FLUSH) 0.9 %
10 SYRINGE (ML) INJECTION
OUTPATIENT
Start: 2024-12-11

## 2024-11-26 RX ORDER — EPINEPHRINE 0.3 MG/.3ML
0.3 INJECTION SUBCUTANEOUS ONCE AS NEEDED
OUTPATIENT
Start: 2024-12-04

## 2024-11-27 ENCOUNTER — INFUSION (OUTPATIENT)
Dept: INFUSION THERAPY | Facility: HOSPITAL | Age: 59
End: 2024-11-27
Attending: INTERNAL MEDICINE
Payer: COMMERCIAL

## 2024-11-27 VITALS
DIASTOLIC BLOOD PRESSURE: 78 MMHG | OXYGEN SATURATION: 100 % | RESPIRATION RATE: 18 BRPM | SYSTOLIC BLOOD PRESSURE: 129 MMHG | BODY MASS INDEX: 32.81 KG/M2 | WEIGHT: 203.25 LBS | HEART RATE: 74 BPM | TEMPERATURE: 98 F

## 2024-11-27 DIAGNOSIS — C50.412 MALIGNANT NEOPLASM OF UPPER-OUTER QUADRANT OF LEFT BREAST IN FEMALE, ESTROGEN RECEPTOR NEGATIVE: Primary | ICD-10-CM

## 2024-11-27 DIAGNOSIS — C78.02 SECONDARY MALIGNANT NEOPLASM OF HILUS OF LEFT LUNG: ICD-10-CM

## 2024-11-27 DIAGNOSIS — C77.1 SECONDARY MALIGNANT NEOPLASM OF MEDIASTINAL LYMPH NODE: ICD-10-CM

## 2024-11-27 DIAGNOSIS — Z17.1 MALIGNANT NEOPLASM OF UPPER-OUTER QUADRANT OF LEFT BREAST IN FEMALE, ESTROGEN RECEPTOR NEGATIVE: Primary | ICD-10-CM

## 2024-11-27 PROCEDURE — 96413 CHEMO IV INFUSION 1 HR: CPT

## 2024-11-27 PROCEDURE — 63600175 PHARM REV CODE 636 W HCPCS: Mod: JZ,JG | Performed by: INTERNAL MEDICINE

## 2024-11-27 RX ORDER — HEPARIN 100 UNIT/ML
500 SYRINGE INTRAVENOUS
Status: DISCONTINUED | OUTPATIENT
Start: 2024-11-27 | End: 2024-11-27 | Stop reason: HOSPADM

## 2024-11-27 RX ADMIN — HEPARIN 500 UNITS: 100 SYRINGE at 11:11

## 2024-11-27 RX ADMIN — PACLITAXEL 200 MG: 100 INJECTION, POWDER, LYOPHILIZED, FOR SUSPENSION INTRAVENOUS at 10:11

## 2024-11-27 NOTE — PROGRESS NOTES
"  [facility]/ [facility]   at [facility] Note:      Chief Complaint:   Encounter Diagnoses   Name Primary?    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 female who presents today for follow up for stage IV TNBC. She presents today for a follow up for cycle #6 day 1.   Denies fevers and chills. Denies coughing and shortness of breath. Also  She was seen by Dr Sumner (with GI) with treatment plan. She has follow up scheduled January 2025  Doing well on abraxane. She has minimal complaints. Denies nausea and vomiting and denies constipation and diarrhea.    Zometa causes fatigue    Per Dr. Anderson's previous note: Oncology History   Patient with TNBC diagnosed in 2022 in [place], [place]   Routine screening ultrasound demonstrated 11mm mass in the L breast   Biopsy 9/30/22: IDC, grade 3; ER-/GA-/HER2 0; PDL1 CPS <10    NACT with weekly carboplatin/paclitaxel x 12  Bilateral mastectomy March 2022: residual IDC, 1.7mm; 0 LN+  Adjuvant AC x 4 completed 5/23/23    Routine PET imaging without new disease  Follow up June 2024 demonstrated "elevated tumor markers" with high  and LDH  PET 6/11/24: bilateral hilar and mediastinal LAD, 2.7cm KRIS mass  Bronchoscopy 6/18/24: metastatic carcinoma 4R/11L c/w breast carcinoma     Started Keytruda/Abraxane 6/20/24, dropped Keytruda given no PDL1  Repeat 22C3 showed CPS > 10   October imaging stable     Of note: diagnosed UC 2019 in [place] "     Patient Active Problem List   Diagnosis    Malignant neoplasm of upper-outer quadrant of left breast in female, estrogen receptor negative    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    Secondary malignant neoplasm of mediastinal lymph node    Ulcerative rectosigmoiditis with rectal bleeding    Chronic constipation       Current Outpatient Medications   Medication Instructions    calcium carbonate (CALCIUM 500 ORAL) Daily    cholecalciferol (vitamin D3) (VITAMIN D3) 5,000 Units, Daily    ezetimibe (ZETIA) 10 mg, Daily    hydroCHLOROthiazide (HYDRODIURIL) 25 mg, Daily    levothyroxine (SYNTHROID) 112 MCG tablet 1 tablet, Daily    meloxicam (MOBIC) 15 mg, Daily    mesalamine (CANASA) 1,000 mg, Rectal, 2 times daily    mesalamine (LIALDA) 4.8 g, Oral, Daily    potassium chloride SA (K-DUR,KLOR-CON M) 10 MEQ tablet 20 mEq, Oral, Daily    prochlorperazine (COMPAZINE) 10 mg, Every 12 hours PRN    venlafaxine (EFFEXOR-XR) 225 mg, Daily    zoledronic acid (ZOMETA IV) Every 28 days    ZyPREXA 10 mg, Daily       Review of Systems:   Review of Systems   Constitutional:  Positive for malaise/fatigue.        Mild fatigue, able to do adls   HENT: Negative.     Respiratory: Negative.  Negative for cough and shortness of breath.    Cardiovascular: Negative.  Negative for chest pain.   Gastrointestinal:  Negative for abdominal pain, constipation, diarrhea and nausea.   Genitourinary:  Negative for frequency.   Musculoskeletal: Negative.  Negative for back pain.   Skin:  Negative for rash.   Neurological: Negative.  Negative for headaches.   Psychiatric/Behavioral:  The patient is not nervous/anxious.    All other systems reviewed and are negative.    Vitals:    12/10/24 1117   BP: 123/73   Pulse: 72   Temp: 97.8 °F (36.6 °C)         Assessment & Plan:  1. Malignant neoplasm of upper-outer quadrant of left breast in female, estrogen receptor negative    2. 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  Secondary malignant neoplasm of mediastinal lymph node      Patient with stage IV TNBC on abraxane  Previously receiving Keytruda/Abraxane but review of records show PDL1 CPS <10 on lung assay  While pending 22C3 testing, continued weekly abraxane 3 on/ 1 off. Repeat 22C3 testing shows CPS >10.   Repeat imaging October 2024 shows stable size and improved SUV in index bone lesions. Artifact on imaging obscures lung nodule.   Worsening ulcerative proctitis, which precedes cancer diagnosis. Seen by Dr Sumner with recommendations     Will delay next cycle 1 week as her son will be in town and she is due for treatment serafin day. Will start 1/2    Will plan to continue 3 additional cycles and then repeat PET imaging for treatment assessment in mid-January (1/22/24)  If POD, plan to add in Keytruda to see if there is improvement     Zometa every 4 weeks - due in Jan (cycle 7 day 8).     Return to clinic in 2 weeks with MD appointment and labs.     Patient is in agreement with the proposed treatment plan. All questions were answered to the patient's satisfaction. Patient knows to call clinic for any new or worsening symptoms and if anything is needed before the next clinic visit.          Yessica Cunningham, FNP-C  Hematology & Medical Oncology   [facility] [place] [address]  ph. [phone]  Fax. [phone]    Collaborating physician, Dr. Anderson.    Approximately 13 minutes were spent face-to-face with the patient.  Approximately 20 minutes in total were spent on this encounter, which includes face-to-face time and non-face-to-face time preparing to see the patient (e.g., review of tests), obtaining and/or reviewing separately obtained history, documenting clinical information in the electronic or other health record, independently interpreting results (not separately reported) and communicating results to the patient/family/caregiver, or care coordination (not separately reported).       Route Chart  for Scheduling    Med Onc Chart Routing      Follow up with physician 3 weeks. with Dr. Anderson due on 12/31 if possible, can be virtual   Follow up with LINDA    Infusion scheduling note   [facility] weekly abraxane next due 1/2   Injection scheduling note    Labs CBC and CMP   Scheduling:  Preferred lab:  Lab interval:  weekly, next due 12/31 at the [facility]   Imaging    Pharmacy appointment    Other referrals                  Treatment Plan Information   OP BREAST NAB-PACLITAXEL WEEKLY Renetta Anderson MD   Associated diagnosis: Malignant neoplasm of upper-outer quadrant of left breast in female, estrogen receptor negative Stage IV rcTX, cNX, pM1, G3, ER-, [place]-, HER2- noted on 7/16/2024  Associated diagnosis: 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   noted on 7/16/2024  Associated diagnosis: Secondary malignant neoplasm of mediastinal lymph node   noted on 7/16/2024   Line of treatment: First Line  Treatment Goal: Palliative     Upcoming Treatment Dates - OP BREAST NAB-PACLITAXEL WEEKLY    12/4/2024       Chemotherapy       PACLitaxeL protein-bound (ABRAXANE) 100 mg/m2 = 210 mg in 42 mL infusion  12/11/2024       Chemotherapy       PACLitaxeL protein-bound (ABRAXANE) 100 mg/m2 = 210 mg in 42 mL infusion  12/25/2024       Chemotherapy       PACLitaxeL protein-bound (ABRAXANE) 100 mg/m2 = 210 mg in 42 mL infusion  1/1/2025       Chemotherapy       PACLitaxeL protein-bound (ABRAXANE) 100 mg/m2 = 210 mg in 42 mL infusion    Supportive Plan Information  OP ZOLEDRONIC ACID (ZOMETA) Q4W Renetta Anderson MD   Associated Diagnosis: Malignant neoplasm of upper-outer quadrant of left breast in female, estrogen receptor negative Stage IV rcTX, cNX, pM1, G3, ER-, [place]-, HER2- noted on 7/16/2024   Line of treatment: Supportive Care   Treatment goal: Palliative     Upcoming Treatment Dates - OP ZOLEDRONIC ACID (ZOMETA) Q4W    12/4/2024       Medications       zoledronic acid (ZOMETA) 4 mg in 0.9% NaCl 100 mL IVPB  1/1/2025        Medications       zoledronic acid (ZOMETA) 4 mg in 0.9% NaCl 100 mL IVPB  1/29/2025       Medications       zoledronic acid (ZOMETA) 4 mg in 0.9% NaCl 100 mL IVPB  2/26/2025       Medications       zoledronic acid (ZOMETA) 4 mg in 0.9% NaCl 100 mL IVPB    MDM includes  :    - Acute or chronic illness or injury that poses a threat to life or bodily function  - Independent review and explanation of 3+ results from unique tests  - Discussion of management and ordering 3+ unique tests  - Extensive discussion of treatment and management  - Prescription drug management  - Drug therapy requiring intensive monitoring for toxicity    Yessica Cunningham, NP   11/27/2024

## 2024-11-27 NOTE — PLAN OF CARE
Patient ambulated onto unit, no s/s of distress or complaints, VSS. Plan of care reviewed with patient. Port accessed, Abraxane administered. Patient tolerated treatment well. Reminded of next appointment, calendar offered. Patient ambulated off unit, no s/s of distress.  Problem: Oncology Care  Goal: Effective Coping  Outcome: Progressing  Goal: Improved Activity Tolerance  Outcome: Progressing  Goal: Optimal Oral Intake  Outcome: Progressing  Goal: Improved Oral Mucous Membrane Integrity  Outcome: Progressing  Goal: Optimal Pain Control and Function  Outcome: Progressing

## 2024-12-02 ENCOUNTER — PATIENT MESSAGE (OUTPATIENT)
Dept: GASTROENTEROLOGY | Facility: CLINIC | Age: 59
End: 2024-12-02
Payer: COMMERCIAL

## 2024-12-02 DIAGNOSIS — C50.412 MALIGNANT NEOPLASM OF UPPER-OUTER QUADRANT OF LEFT BREAST IN FEMALE, ESTROGEN RECEPTOR NEGATIVE: Primary | ICD-10-CM

## 2024-12-02 DIAGNOSIS — Z17.1 MALIGNANT NEOPLASM OF UPPER-OUTER QUADRANT OF LEFT BREAST IN FEMALE, ESTROGEN RECEPTOR NEGATIVE: Primary | ICD-10-CM

## 2024-12-02 LAB — CALPROTECTIN STL-MCNT: 444 MCG/G

## 2024-12-03 ENCOUNTER — LAB VISIT (OUTPATIENT)
Dept: LAB | Facility: HOSPITAL | Age: 59
End: 2024-12-03
Attending: INTERNAL MEDICINE
Payer: COMMERCIAL

## 2024-12-03 DIAGNOSIS — C50.412 MALIGNANT NEOPLASM OF UPPER-OUTER QUADRANT OF LEFT BREAST IN FEMALE, ESTROGEN RECEPTOR NEGATIVE: ICD-10-CM

## 2024-12-03 DIAGNOSIS — Z17.1 MALIGNANT NEOPLASM OF UPPER-OUTER QUADRANT OF LEFT BREAST IN FEMALE, ESTROGEN RECEPTOR NEGATIVE: ICD-10-CM

## 2024-12-03 LAB
ALBUMIN SERPL BCP-MCNC: 3.4 G/DL (ref 3.5–5.2)
ALP SERPL-CCNC: 97 U/L (ref 40–150)
ALT SERPL W/O P-5'-P-CCNC: 22 U/L (ref 10–44)
ANION GAP SERPL CALC-SCNC: 9 MMOL/L (ref 8–16)
AST SERPL-CCNC: 23 U/L (ref 10–40)
BASOPHILS # BLD AUTO: 0.03 K/UL (ref 0–0.2)
BASOPHILS NFR BLD: 0.7 % (ref 0–1.9)
BILIRUB SERPL-MCNC: 0.4 MG/DL (ref 0.1–1)
BUN SERPL-MCNC: 7 MG/DL (ref 6–20)
CALCIUM SERPL-MCNC: 8.9 MG/DL (ref 8.7–10.5)
CHLORIDE SERPL-SCNC: 99 MMOL/L (ref 95–110)
CO2 SERPL-SCNC: 28 MMOL/L (ref 23–29)
CREAT SERPL-MCNC: 0.8 MG/DL (ref 0.5–1.4)
DIFFERENTIAL METHOD BLD: ABNORMAL
EOSINOPHIL # BLD AUTO: 0 K/UL (ref 0–0.5)
EOSINOPHIL NFR BLD: 0 % (ref 0–8)
ERYTHROCYTE [DISTWIDTH] IN BLOOD BY AUTOMATED COUNT: 14.2 % (ref 11.5–14.5)
EST. GFR  (NO RACE VARIABLE): >60 ML/MIN/1.73 M^2
GLUCOSE SERPL-MCNC: 119 MG/DL (ref 70–110)
HCT VFR BLD AUTO: 33 % (ref 37–48.5)
HGB BLD-MCNC: 11.6 G/DL (ref 12–16)
IMM GRANULOCYTES # BLD AUTO: 0.02 K/UL (ref 0–0.04)
IMM GRANULOCYTES NFR BLD AUTO: 0.5 % (ref 0–0.5)
LYMPHOCYTES # BLD AUTO: 1.5 K/UL (ref 1–4.8)
LYMPHOCYTES NFR BLD: 35.3 % (ref 18–48)
MCH RBC QN AUTO: 33.7 PG (ref 27–31)
MCHC RBC AUTO-ENTMCNC: 35.2 G/DL (ref 32–36)
MCV RBC AUTO: 96 FL (ref 82–98)
MONOCYTES # BLD AUTO: 0.3 K/UL (ref 0.3–1)
MONOCYTES NFR BLD: 7.7 % (ref 4–15)
NEUTROPHILS # BLD AUTO: 2.3 K/UL (ref 1.8–7.7)
NEUTROPHILS NFR BLD: 55.8 % (ref 38–73)
NRBC BLD-RTO: 0 /100 WBC
PLATELET # BLD AUTO: 254 K/UL (ref 150–450)
PMV BLD AUTO: 8 FL (ref 9.2–12.9)
POTASSIUM SERPL-SCNC: 3.5 MMOL/L (ref 3.5–5.1)
PROT SERPL-MCNC: 7 G/DL (ref 6–8.4)
RBC # BLD AUTO: 3.44 M/UL (ref 4–5.4)
SODIUM SERPL-SCNC: 136 MMOL/L (ref 136–145)
WBC # BLD AUTO: 4.16 K/UL (ref 3.9–12.7)

## 2024-12-03 PROCEDURE — 85025 COMPLETE CBC W/AUTO DIFF WBC: CPT | Performed by: INTERNAL MEDICINE

## 2024-12-03 PROCEDURE — 36415 COLL VENOUS BLD VENIPUNCTURE: CPT | Performed by: INTERNAL MEDICINE

## 2024-12-03 PROCEDURE — 80053 COMPREHEN METABOLIC PANEL: CPT | Performed by: INTERNAL MEDICINE

## 2024-12-04 ENCOUNTER — INFUSION (OUTPATIENT)
Dept: INFUSION THERAPY | Facility: HOSPITAL | Age: 59
End: 2024-12-04
Attending: INTERNAL MEDICINE
Payer: COMMERCIAL

## 2024-12-04 VITALS
TEMPERATURE: 98 F | BODY MASS INDEX: 32.95 KG/M2 | DIASTOLIC BLOOD PRESSURE: 78 MMHG | SYSTOLIC BLOOD PRESSURE: 121 MMHG | HEART RATE: 83 BPM | RESPIRATION RATE: 18 BRPM | WEIGHT: 204.13 LBS | OXYGEN SATURATION: 98 %

## 2024-12-04 DIAGNOSIS — Z17.1 MALIGNANT NEOPLASM OF UPPER-OUTER QUADRANT OF LEFT BREAST IN FEMALE, ESTROGEN RECEPTOR NEGATIVE: Primary | ICD-10-CM

## 2024-12-04 DIAGNOSIS — C78.02 SECONDARY MALIGNANT NEOPLASM OF HILUS OF LEFT LUNG: ICD-10-CM

## 2024-12-04 DIAGNOSIS — C50.412 MALIGNANT NEOPLASM OF UPPER-OUTER QUADRANT OF LEFT BREAST IN FEMALE, ESTROGEN RECEPTOR NEGATIVE: Primary | ICD-10-CM

## 2024-12-04 DIAGNOSIS — C77.1 SECONDARY MALIGNANT NEOPLASM OF MEDIASTINAL LYMPH NODE: ICD-10-CM

## 2024-12-04 PROCEDURE — 96413 CHEMO IV INFUSION 1 HR: CPT

## 2024-12-04 PROCEDURE — 63600175 PHARM REV CODE 636 W HCPCS: Mod: JZ,JG | Performed by: INTERNAL MEDICINE

## 2024-12-04 PROCEDURE — 96375 TX/PRO/DX INJ NEW DRUG ADDON: CPT

## 2024-12-04 RX ORDER — HEPARIN 100 UNIT/ML
500 SYRINGE INTRAVENOUS
Status: DISCONTINUED | OUTPATIENT
Start: 2024-12-04 | End: 2024-12-04 | Stop reason: HOSPADM

## 2024-12-04 RX ORDER — ZOLEDRONIC ACID 0.04 MG/ML
4 INJECTION, SOLUTION INTRAVENOUS
Status: COMPLETED | OUTPATIENT
Start: 2024-12-04 | End: 2024-12-04

## 2024-12-04 RX ADMIN — PACLITAXEL 200 MG: 100 INJECTION, POWDER, LYOPHILIZED, FOR SUSPENSION INTRAVENOUS at 10:12

## 2024-12-04 RX ADMIN — HEPARIN 500 UNITS: 100 SYRINGE at 11:12

## 2024-12-04 RX ADMIN — ZOLEDRONIC ACID 4 MG: 0.04 INJECTION, SOLUTION INTRAVENOUS at 09:12

## 2024-12-04 NOTE — PLAN OF CARE
Pt arrived to unit ambulatory, alert and oriented. Pt received zometa and abraxane with no S&S of complications. Pt has no questions or concerns at this time. Pt discharged off unit in [facility].

## 2024-12-10 ENCOUNTER — LAB VISIT (OUTPATIENT)
Dept: LAB | Facility: HOSPITAL | Age: 59
End: 2024-12-10
Attending: INTERNAL MEDICINE
Payer: COMMERCIAL

## 2024-12-10 ENCOUNTER — OFFICE VISIT (OUTPATIENT)
Dept: HEMATOLOGY/ONCOLOGY | Facility: CLINIC | Age: 59
End: 2024-12-10
Payer: COMMERCIAL

## 2024-12-10 VITALS
HEIGHT: 66 IN | WEIGHT: 202.63 LBS | OXYGEN SATURATION: 99 % | SYSTOLIC BLOOD PRESSURE: 123 MMHG | BODY MASS INDEX: 32.56 KG/M2 | HEART RATE: 72 BPM | DIASTOLIC BLOOD PRESSURE: 73 MMHG | TEMPERATURE: 98 F

## 2024-12-10 DIAGNOSIS — C78.02 SECONDARY MALIGNANT NEOPLASM OF HILUS OF LEFT LUNG: ICD-10-CM

## 2024-12-10 DIAGNOSIS — C50.412 MALIGNANT NEOPLASM OF UPPER-OUTER QUADRANT OF LEFT BREAST IN FEMALE, ESTROGEN RECEPTOR NEGATIVE: Primary | ICD-10-CM

## 2024-12-10 DIAGNOSIS — C50.412 MALIGNANT NEOPLASM OF UPPER-OUTER QUADRANT OF LEFT BREAST IN FEMALE, ESTROGEN RECEPTOR NEGATIVE: ICD-10-CM

## 2024-12-10 DIAGNOSIS — Z17.1 MALIGNANT NEOPLASM OF UPPER-OUTER QUADRANT OF LEFT BREAST IN FEMALE, ESTROGEN RECEPTOR NEGATIVE: Primary | ICD-10-CM

## 2024-12-10 DIAGNOSIS — C77.1 SECONDARY MALIGNANT NEOPLASM OF MEDIASTINAL LYMPH NODE: ICD-10-CM

## 2024-12-10 DIAGNOSIS — Z17.1 MALIGNANT NEOPLASM OF UPPER-OUTER QUADRANT OF LEFT BREAST IN FEMALE, ESTROGEN RECEPTOR NEGATIVE: ICD-10-CM

## 2024-12-10 LAB
ALBUMIN SERPL BCP-MCNC: 3.5 G/DL (ref 3.5–5.2)
ALP SERPL-CCNC: 82 U/L (ref 40–150)
ALT SERPL W/O P-5'-P-CCNC: 26 U/L (ref 10–44)
ANION GAP SERPL CALC-SCNC: 5 MMOL/L (ref 8–16)
AST SERPL-CCNC: 25 U/L (ref 10–40)
BASOPHILS # BLD AUTO: 0.03 K/UL (ref 0–0.2)
BASOPHILS NFR BLD: 0.9 % (ref 0–1.9)
BILIRUB SERPL-MCNC: 0.3 MG/DL (ref 0.1–1)
BUN SERPL-MCNC: 12 MG/DL (ref 6–20)
CALCIUM SERPL-MCNC: 9 MG/DL (ref 8.7–10.5)
CHLORIDE SERPL-SCNC: 100 MMOL/L (ref 95–110)
CO2 SERPL-SCNC: 30 MMOL/L (ref 23–29)
CREAT SERPL-MCNC: 0.8 MG/DL (ref 0.5–1.4)
DIFFERENTIAL METHOD BLD: ABNORMAL
EOSINOPHIL # BLD AUTO: 0 K/UL (ref 0–0.5)
EOSINOPHIL NFR BLD: 0 % (ref 0–8)
ERYTHROCYTE [DISTWIDTH] IN BLOOD BY AUTOMATED COUNT: 14.4 % (ref 11.5–14.5)
EST. GFR  (NO RACE VARIABLE): >60 ML/MIN/1.73 M^2
GLUCOSE SERPL-MCNC: 95 MG/DL (ref 70–110)
HCT VFR BLD AUTO: 32 % (ref 37–48.5)
HGB BLD-MCNC: 10.6 G/DL (ref 12–16)
IMM GRANULOCYTES # BLD AUTO: 0.01 K/UL (ref 0–0.04)
IMM GRANULOCYTES NFR BLD AUTO: 0.3 % (ref 0–0.5)
LYMPHOCYTES # BLD AUTO: 1.4 K/UL (ref 1–4.8)
LYMPHOCYTES NFR BLD: 41.5 % (ref 18–48)
MCH RBC QN AUTO: 32.1 PG (ref 27–31)
MCHC RBC AUTO-ENTMCNC: 33.1 G/DL (ref 32–36)
MCV RBC AUTO: 97 FL (ref 82–98)
MONOCYTES # BLD AUTO: 0.3 K/UL (ref 0.3–1)
MONOCYTES NFR BLD: 9.8 % (ref 4–15)
NEUTROPHILS # BLD AUTO: 1.6 K/UL (ref 1.8–7.7)
NEUTROPHILS NFR BLD: 47.5 % (ref 38–73)
NRBC BLD-RTO: 0 /100 WBC
PLATELET # BLD AUTO: 240 K/UL (ref 150–450)
PMV BLD AUTO: 8 FL (ref 9.2–12.9)
POTASSIUM SERPL-SCNC: 3.7 MMOL/L (ref 3.5–5.1)
PROT SERPL-MCNC: 7 G/DL (ref 6–8.4)
RBC # BLD AUTO: 3.3 M/UL (ref 4–5.4)
SODIUM SERPL-SCNC: 135 MMOL/L (ref 136–145)
WBC # BLD AUTO: 3.28 K/UL (ref 3.9–12.7)

## 2024-12-10 PROCEDURE — 3074F SYST BP LT 130 MM HG: CPT | Mod: CPTII,S$GLB,, | Performed by: NURSE PRACTITIONER

## 2024-12-10 PROCEDURE — 3044F HG A1C LEVEL LT 7.0%: CPT | Mod: CPTII,S$GLB,, | Performed by: NURSE PRACTITIONER

## 2024-12-10 PROCEDURE — 99999 PR PBB SHADOW E&M-EST. PATIENT-LVL IV: CPT | Mod: PBBFAC,,, | Performed by: NURSE PRACTITIONER

## 2024-12-10 PROCEDURE — 3078F DIAST BP <80 MM HG: CPT | Mod: CPTII,S$GLB,, | Performed by: NURSE PRACTITIONER

## 2024-12-10 PROCEDURE — G2211 COMPLEX E/M VISIT ADD ON: HCPCS | Mod: S$GLB,,, | Performed by: NURSE PRACTITIONER

## 2024-12-10 PROCEDURE — 99215 OFFICE O/P EST HI 40 MIN: CPT | Mod: S$GLB,,, | Performed by: NURSE PRACTITIONER

## 2024-12-10 PROCEDURE — 85025 COMPLETE CBC W/AUTO DIFF WBC: CPT | Performed by: INTERNAL MEDICINE

## 2024-12-10 PROCEDURE — 1159F MED LIST DOCD IN RCRD: CPT | Mod: CPTII,S$GLB,, | Performed by: NURSE PRACTITIONER

## 2024-12-10 PROCEDURE — 1160F RVW MEDS BY RX/DR IN RCRD: CPT | Mod: CPTII,S$GLB,, | Performed by: NURSE PRACTITIONER

## 2024-12-10 PROCEDURE — 36415 COLL VENOUS BLD VENIPUNCTURE: CPT | Performed by: INTERNAL MEDICINE

## 2024-12-10 PROCEDURE — 3008F BODY MASS INDEX DOCD: CPT | Mod: CPTII,S$GLB,, | Performed by: NURSE PRACTITIONER

## 2024-12-10 PROCEDURE — 80053 COMPREHEN METABOLIC PANEL: CPT | Performed by: INTERNAL MEDICINE

## 2024-12-11 ENCOUNTER — INFUSION (OUTPATIENT)
Dept: INFUSION THERAPY | Facility: HOSPITAL | Age: 59
End: 2024-12-11
Attending: INTERNAL MEDICINE
Payer: COMMERCIAL

## 2024-12-11 DIAGNOSIS — C78.02 SECONDARY MALIGNANT NEOPLASM OF HILUS OF LEFT LUNG: ICD-10-CM

## 2024-12-11 DIAGNOSIS — Z17.1 MALIGNANT NEOPLASM OF UPPER-OUTER QUADRANT OF LEFT BREAST IN FEMALE, ESTROGEN RECEPTOR NEGATIVE: Primary | ICD-10-CM

## 2024-12-11 DIAGNOSIS — C50.412 MALIGNANT NEOPLASM OF UPPER-OUTER QUADRANT OF LEFT BREAST IN FEMALE, ESTROGEN RECEPTOR NEGATIVE: Primary | ICD-10-CM

## 2024-12-11 DIAGNOSIS — C77.1 SECONDARY MALIGNANT NEOPLASM OF MEDIASTINAL LYMPH NODE: ICD-10-CM

## 2024-12-11 PROCEDURE — 96413 CHEMO IV INFUSION 1 HR: CPT

## 2024-12-11 PROCEDURE — 63600175 PHARM REV CODE 636 W HCPCS: Performed by: INTERNAL MEDICINE

## 2024-12-11 RX ORDER — HEPARIN 100 UNIT/ML
500 SYRINGE INTRAVENOUS
Status: DISCONTINUED | OUTPATIENT
Start: 2024-12-11 | End: 2024-12-11 | Stop reason: HOSPADM

## 2024-12-11 RX ADMIN — HEPARIN SODIUM (PORCINE) LOCK FLUSH IV SOLN 100 UNIT/ML 500 UNITS: 100 SOLUTION at 10:12

## 2024-12-11 RX ADMIN — PACLITAXEL 200 MG: 100 INJECTION, POWDER, LYOPHILIZED, FOR SUSPENSION INTRAVENOUS at 10:12

## 2024-12-11 NOTE — PLAN OF CARE
Pt ambulated to clinic w/o difficulty. Pt received Abraxane infusion over 60 minutes via accessed port, 20, 3/4 in, power cotton needle. Site c/d/I, free signs of infection. Pt tolerated infusion well. No s/s of a reaction. Pt assisted by Nurse Lorenz with rescheduling upcoming appointments. Care plan discussed with pt. Pt verbalized understanding. Pt left clinic by in NAD.       Problem: Fall Injury Risk  Goal: Absence of Fall and Fall-Related Injury  Outcome: Met

## 2024-12-17 ENCOUNTER — LAB VISIT (OUTPATIENT)
Dept: LAB | Facility: HOSPITAL | Age: 59
End: 2024-12-17
Attending: INTERNAL MEDICINE
Payer: COMMERCIAL

## 2024-12-17 DIAGNOSIS — K51.311 ULCERATIVE RECTOSIGMOIDITIS WITH RECTAL BLEEDING: ICD-10-CM

## 2024-12-17 PROCEDURE — 83993 ASSAY FOR CALPROTECTIN FECAL: CPT | Performed by: INTERNAL MEDICINE

## 2024-12-19 ENCOUNTER — PATIENT MESSAGE (OUTPATIENT)
Dept: GASTROENTEROLOGY | Facility: CLINIC | Age: 59
End: 2024-12-19
Payer: COMMERCIAL

## 2024-12-20 ENCOUNTER — TELEPHONE (OUTPATIENT)
Dept: GASTROENTEROLOGY | Facility: CLINIC | Age: 59
End: 2024-12-20
Payer: COMMERCIAL

## 2024-12-20 DIAGNOSIS — K51.311 ULCERATIVE RECTOSIGMOIDITIS WITH RECTAL BLEEDING: Primary | ICD-10-CM

## 2024-12-20 LAB — CALPROTECTIN STL-MCNT: 551 MCG/G

## 2024-12-20 RX ORDER — BUDESONIDE 2 MG/1
2 AEROSOL, FOAM RECTAL 2 TIMES DAILY
Qty: 133.6 G | Refills: 1 | Status: SHIPPED | OUTPATIENT
Start: 2024-12-20

## 2024-12-20 NOTE — TELEPHONE ENCOUNTER
Spoke with Jacki:  - UC, proctosigmoiditis   - IBD meds:    - Lialda 4.8 G/d    - Canasa BID  Current symptoms:   - 3-6 BM/d, soft   - uses colon cleanse q4-5 days, has been passing stool without needing colon cleanse   - + blood on stool & TP, some tiny clots   - + mucous  - reviewed Dr. Sumner's recommendation: stop Canasa, start Uceris foam BID x 1 mo  - stool kaushik in 2 wks  - RN check in 2 wks   - she states understanding and agrees with this plan

## 2024-12-20 NOTE — TELEPHONE ENCOUNTER
----- Message from Alphonse Sumner MD sent at 12/20/2024 12:38 PM CST -----  Stool calprotectin higher than prior despite BID canasa.   Get symptom update and find out how often also she is doing colon cleanse and if able to hold in canasa supp.   I would like her to stop canasa supp given it is not effective and start uceris foam BID for 1 month with repeat stool calprotectin and RN check in 2 weeks    SS  ----- Message -----  From: [facility] Interface  Sent: 12/20/2024  10:30 AM CST  To: Alphonse Sumner MD

## 2024-12-23 ENCOUNTER — TELEPHONE (OUTPATIENT)
Dept: GASTROENTEROLOGY | Facility: CLINIC | Age: 59
End: 2024-12-23
Payer: COMMERCIAL

## 2024-12-30 ENCOUNTER — LAB VISIT (OUTPATIENT)
Dept: LAB | Facility: HOSPITAL | Age: 59
End: 2024-12-30
Attending: INTERNAL MEDICINE
Payer: COMMERCIAL

## 2024-12-30 DIAGNOSIS — C50.412 MALIGNANT NEOPLASM OF UPPER-OUTER QUADRANT OF LEFT BREAST IN FEMALE, ESTROGEN RECEPTOR NEGATIVE: ICD-10-CM

## 2024-12-30 DIAGNOSIS — Z17.1 MALIGNANT NEOPLASM OF UPPER-OUTER QUADRANT OF LEFT BREAST IN FEMALE, ESTROGEN RECEPTOR NEGATIVE: ICD-10-CM

## 2024-12-30 LAB
ALBUMIN SERPL BCP-MCNC: 3.7 G/DL (ref 3.5–5.2)
ALP SERPL-CCNC: 91 U/L (ref 40–150)
ALT SERPL W/O P-5'-P-CCNC: 22 U/L (ref 10–44)
ANION GAP SERPL CALC-SCNC: 10 MMOL/L (ref 8–16)
AST SERPL-CCNC: 25 U/L (ref 10–40)
BASOPHILS # BLD AUTO: 0.04 K/UL (ref 0–0.2)
BASOPHILS NFR BLD: 0.9 % (ref 0–1.9)
BILIRUB SERPL-MCNC: 0.3 MG/DL (ref 0.1–1)
BUN SERPL-MCNC: 10 MG/DL (ref 6–20)
CALCIUM SERPL-MCNC: 9.3 MG/DL (ref 8.7–10.5)
CHLORIDE SERPL-SCNC: 100 MMOL/L (ref 95–110)
CO2 SERPL-SCNC: 28 MMOL/L (ref 23–29)
CREAT SERPL-MCNC: 0.9 MG/DL (ref 0.5–1.4)
DIFFERENTIAL METHOD BLD: ABNORMAL
EOSINOPHIL # BLD AUTO: 0 K/UL (ref 0–0.5)
EOSINOPHIL NFR BLD: 0.2 % (ref 0–8)
ERYTHROCYTE [DISTWIDTH] IN BLOOD BY AUTOMATED COUNT: 14.5 % (ref 11.5–14.5)
EST. GFR  (NO RACE VARIABLE): >60 ML/MIN/1.73 M^2
GLUCOSE SERPL-MCNC: 101 MG/DL (ref 70–110)
HCT VFR BLD AUTO: 36.5 % (ref 37–48.5)
HGB BLD-MCNC: 12.1 G/DL (ref 12–16)
IMM GRANULOCYTES # BLD AUTO: 0.02 K/UL (ref 0–0.04)
IMM GRANULOCYTES NFR BLD AUTO: 0.4 % (ref 0–0.5)
LYMPHOCYTES # BLD AUTO: 1.6 K/UL (ref 1–4.8)
LYMPHOCYTES NFR BLD: 34.6 % (ref 18–48)
MCH RBC QN AUTO: 32.2 PG (ref 27–31)
MCHC RBC AUTO-ENTMCNC: 33.2 G/DL (ref 32–36)
MCV RBC AUTO: 97 FL (ref 82–98)
MONOCYTES # BLD AUTO: 0.5 K/UL (ref 0.3–1)
MONOCYTES NFR BLD: 11.3 % (ref 4–15)
NEUTROPHILS # BLD AUTO: 2.4 K/UL (ref 1.8–7.7)
NEUTROPHILS NFR BLD: 52.6 % (ref 38–73)
NRBC BLD-RTO: 0 /100 WBC
PLATELET # BLD AUTO: 208 K/UL (ref 150–450)
PMV BLD AUTO: 7.6 FL (ref 9.2–12.9)
POTASSIUM SERPL-SCNC: 4.2 MMOL/L (ref 3.5–5.1)
PROT SERPL-MCNC: 7.1 G/DL (ref 6–8.4)
RBC # BLD AUTO: 3.76 M/UL (ref 4–5.4)
SODIUM SERPL-SCNC: 138 MMOL/L (ref 136–145)
WBC # BLD AUTO: 4.6 K/UL (ref 3.9–12.7)

## 2024-12-30 PROCEDURE — 36415 COLL VENOUS BLD VENIPUNCTURE: CPT | Performed by: INTERNAL MEDICINE

## 2024-12-30 PROCEDURE — 80053 COMPREHEN METABOLIC PANEL: CPT | Performed by: INTERNAL MEDICINE

## 2024-12-30 PROCEDURE — 85025 COMPLETE CBC W/AUTO DIFF WBC: CPT | Performed by: INTERNAL MEDICINE

## 2024-12-31 ENCOUNTER — OFFICE VISIT (OUTPATIENT)
Dept: HEMATOLOGY/ONCOLOGY | Facility: CLINIC | Age: 59
End: 2024-12-31
Payer: COMMERCIAL

## 2024-12-31 VITALS
TEMPERATURE: 98 F | RESPIRATION RATE: 18 BRPM | WEIGHT: 203.94 LBS | SYSTOLIC BLOOD PRESSURE: 124 MMHG | DIASTOLIC BLOOD PRESSURE: 72 MMHG | HEIGHT: 66 IN | BODY MASS INDEX: 32.78 KG/M2 | OXYGEN SATURATION: 99 % | HEART RATE: 80 BPM

## 2024-12-31 DIAGNOSIS — Z79.899 IMMUNODEFICIENCY DUE TO DRUGS: ICD-10-CM

## 2024-12-31 DIAGNOSIS — C77.1 SECONDARY MALIGNANT NEOPLASM OF MEDIASTINAL LYMPH NODE: ICD-10-CM

## 2024-12-31 DIAGNOSIS — C78.02 SECONDARY MALIGNANT NEOPLASM OF HILUS OF LEFT LUNG: ICD-10-CM

## 2024-12-31 DIAGNOSIS — Z17.1 MALIGNANT NEOPLASM OF UPPER-OUTER QUADRANT OF LEFT BREAST IN FEMALE, ESTROGEN RECEPTOR NEGATIVE: Primary | ICD-10-CM

## 2024-12-31 DIAGNOSIS — D84.821 IMMUNODEFICIENCY DUE TO DRUGS: ICD-10-CM

## 2024-12-31 DIAGNOSIS — C50.412 MALIGNANT NEOPLASM OF UPPER-OUTER QUADRANT OF LEFT BREAST IN FEMALE, ESTROGEN RECEPTOR NEGATIVE: Primary | ICD-10-CM

## 2024-12-31 PROCEDURE — 3078F DIAST BP <80 MM HG: CPT | Mod: CPTII,S$GLB,, | Performed by: INTERNAL MEDICINE

## 2024-12-31 PROCEDURE — 99215 OFFICE O/P EST HI 40 MIN: CPT | Mod: S$GLB,,, | Performed by: INTERNAL MEDICINE

## 2024-12-31 PROCEDURE — 3044F HG A1C LEVEL LT 7.0%: CPT | Mod: CPTII,S$GLB,, | Performed by: INTERNAL MEDICINE

## 2024-12-31 PROCEDURE — 99999 PR PBB SHADOW E&M-EST. PATIENT-LVL III: CPT | Mod: PBBFAC,,, | Performed by: INTERNAL MEDICINE

## 2024-12-31 PROCEDURE — 3008F BODY MASS INDEX DOCD: CPT | Mod: CPTII,S$GLB,, | Performed by: INTERNAL MEDICINE

## 2024-12-31 PROCEDURE — G2211 COMPLEX E/M VISIT ADD ON: HCPCS | Mod: S$GLB,,, | Performed by: INTERNAL MEDICINE

## 2024-12-31 PROCEDURE — 3074F SYST BP LT 130 MM HG: CPT | Mod: CPTII,S$GLB,, | Performed by: INTERNAL MEDICINE

## 2024-12-31 RX ORDER — HEPARIN 100 UNIT/ML
500 SYRINGE INTRAVENOUS
Status: CANCELLED | OUTPATIENT
Start: 2025-01-02

## 2024-12-31 RX ORDER — DIPHENHYDRAMINE HYDROCHLORIDE 50 MG/ML
50 INJECTION INTRAMUSCULAR; INTRAVENOUS ONCE AS NEEDED
Status: CANCELLED | OUTPATIENT
Start: 2025-01-02

## 2024-12-31 RX ORDER — PROCHLORPERAZINE EDISYLATE 5 MG/ML
10 INJECTION INTRAMUSCULAR; INTRAVENOUS ONCE AS NEEDED
Status: CANCELLED
Start: 2025-01-02

## 2024-12-31 RX ORDER — EPINEPHRINE 0.3 MG/.3ML
0.3 INJECTION SUBCUTANEOUS ONCE AS NEEDED
Status: CANCELLED | OUTPATIENT
Start: 2025-01-02

## 2024-12-31 RX ORDER — SODIUM CHLORIDE 0.9 % (FLUSH) 0.9 %
10 SYRINGE (ML) INJECTION
Status: CANCELLED | OUTPATIENT
Start: 2025-01-02

## 2024-12-31 NOTE — PROGRESS NOTES
"  [facility]/ [facility]   at [facility] Note:      Chief Complaint:   Encounter Diagnoses   Name Primary?    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 female who presents today for follow up for stage IV TNBC. She presents today for a follow up for cycle #7   She has been doing well on abraxane. Has mild hair loss  She has no new complaints today. Colitis is much better controlled with improved stool and decreased blood      Oncology History   Patient with TNBC diagnosed in 2022 in [place], [place]   Routine screening ultrasound demonstrated 11mm mass in the L breast   Biopsy 9/30/22: IDC, grade 3; ER-/FL-/HER2 0; PDL1 CPS <10    NACT with weekly carboplatin/paclitaxel x 12  Bilateral mastectomy March 2022: residual IDC, 1.7mm; 0 LN+  Adjuvant AC x 4 completed 5/23/23    Routine PET imaging without new disease  Follow up June 2024 demonstrated "elevated tumor markers" with high  and LDH  PET 6/11/24: bilateral hilar and mediastinal LAD, 2.7cm KRIS mass  Bronchoscopy 6/18/24: metastatic carcinoma 4R/11L c/w breast carcinoma     Started Keytruda/Abraxane 6/20/24, dropped Keytruda given no PDL1  Repeat 22C3 showed CPS > 10   October imaging stable     Of note: diagnosed UC 2019 in [place]     Patient Active Problem List   Diagnosis    Malignant neoplasm of upper-outer quadrant of left breast in female, estrogen receptor negative    " 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    Secondary malignant neoplasm of mediastinal lymph node    Ulcerative rectosigmoiditis with rectal bleeding    Chronic constipation    Immunodeficiency due to drugs       Current Outpatient Medications   Medication Instructions    budesonide (UCERIS) 2 mg, Rectal, 2 times daily, 1 pump twice daily for 4 weeks    calcium carbonate (CALCIUM 500 ORAL) Daily    cholecalciferol (vitamin D3) (VITAMIN D3) 5,000 Units, Daily    ezetimibe (ZETIA) 10 mg, Daily    hydroCHLOROthiazide (HYDRODIURIL) 25 mg, Daily    levothyroxine (SYNTHROID) 112 MCG tablet 1 tablet, Daily    meloxicam (MOBIC) 15 mg, Daily    mesalamine (CANASA) 1,000 mg, Rectal, 2 times daily    mesalamine (LIALDA) 4.8 g, Oral, Daily    potassium chloride SA (K-DUR,KLOR-CON M) 10 MEQ tablet 20 mEq, Oral, Daily    prochlorperazine (COMPAZINE) 10 mg, Every 12 hours PRN    venlafaxine (EFFEXOR-XR) 225 mg, Daily    zoledronic acid (ZOMETA IV) Every 28 days    ZyPREXA 10 mg, Daily       Review of Systems:   Review of Systems   Constitutional:  Positive for malaise/fatigue.        Mild fatigue, able to do adls   HENT: Negative.     Respiratory: Negative.  Negative for cough and shortness of breath.    Cardiovascular: Negative.  Negative for chest pain.   Gastrointestinal:  Negative for abdominal pain, constipation, diarrhea and nausea.   Genitourinary:  Negative for frequency.   Musculoskeletal: Negative.  Negative for back pain.   Skin:  Negative for rash.   Neurological: Negative.  Negative for headaches.   Psychiatric/Behavioral:  The patient is not nervous/anxious.    All other systems reviewed and are negative.    Vitals:    12/31/24 1329   BP: 124/72   Pulse: 80   Resp: 18   Temp: 98.4 °F (36.9 °C)         Assessment & Plan:  1. Malignant neoplasm of upper-outer quadrant of left breast in female, estrogen receptor negative    2. 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 Secondary malignant  neoplasm of mediastinal lymph node    4. Immunodeficiency due to drugs        Patient with stage IV TNBC on abraxane  Previously receiving Keytruda/Abraxane but review of records show PDL1 CPS <10 on lung assay  While pending 22C3 testing, continued weekly abraxane 3 on/ 1 off. Repeat 22C3 testing shows CPS >10.   Repeat imaging October 2024 shows stable size and improved SUV in index bone lesions. Artifact on imaging obscures lung nodule.   Worsening ulcerative proctitis, which precedes cancer diagnosis. Seen by Dr Sumner with recommendations       Will plan to continue abraxane and then repeat PET imaging for treatment assessment in mid-January (1/22/24)  If POD, plan to add in Keytruda to see if there is improvement     Zometa every 4 weeks - due in Jan (cycle 7 day 8).     Return to clinic in 2 weeks with MD appointment and labs.       Route Chart for Scheduling    Med Onc Chart Routing      Follow up with physician . 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   noted on 7/16/2024  Associated diagnosis: Secondary malignant neoplasm of mediastinal lymph node   noted on 7/16/2024   Line of treatment: First Line  Treatment Goal: Palliative     Upcoming Treatment Dates - OP BREAST NAB-PACLITAXEL WEEKLY    1/2/2025       Chemotherapy       PACLitaxeL protein-bound (ABRAXANE) 100 mg/m2 = 210 mg in 42 mL infusion  1/9/2025       Chemotherapy       PACLitaxeL protein-bound (ABRAXANE) 100 mg/m2 = 210 mg in 42 mL infusion  1/16/2025       Chemotherapy        PACLitaxeL protein-bound (ABRAXANE) 100 mg/m2 = 210 mg in 42 mL infusion    Supportive Plan Information  OP ZOLEDRONIC ACID (ZOMETA) Q4W Renetta Anderson MD   Associated Diagnosis: Malignant neoplasm of upper-outer quadrant of left breast in female, estrogen receptor negative Stage IV rcTX, cNX, pM1, G3, ER-, NV-, HER2- noted on 7/16/2024   Line of treatment: Supportive Care   Treatment goal: Palliative     Upcoming Treatment Dates - OP ZOLEDRONIC ACID (ZOMETA) Q4W    1/1/2025       Medications       zoledronic acid (ZOMETA) 4 mg in 0.9% NaCl 100 mL IVPB  1/29/2025       Medications       zoledronic acid (ZOMETA) 4 mg in 0.9% NaCl 100 mL IVPB  2/26/2025       Medications       zoledronic acid (ZOMETA) 4 mg in 0.9% NaCl 100 mL IVPB  3/26/2025       Medications       zoledronic acid (ZOMETA) 4 mg in 0.9% NaCl 100 mL IVPB    MDM includes  :    - Acute or chronic illness or injury that poses a threat to life or bodily function  - Independent review and explanation of 3+ results from unique tests  - Discussion of management and ordering 3+ unique tests  - Extensive discussion of treatment and management  - Prescription drug management  - Drug therapy requiring intensive monitoring for toxicity    Renetta Anderson MD   12/31/2024

## 2025-01-02 ENCOUNTER — INFUSION (OUTPATIENT)
Dept: INFUSION THERAPY | Facility: HOSPITAL | Age: 60
End: 2025-01-02
Attending: INTERNAL MEDICINE
Payer: COMMERCIAL

## 2025-01-02 VITALS
SYSTOLIC BLOOD PRESSURE: 111 MMHG | HEIGHT: 66 IN | HEART RATE: 79 BPM | DIASTOLIC BLOOD PRESSURE: 60 MMHG | OXYGEN SATURATION: 99 % | RESPIRATION RATE: 16 BRPM | BODY MASS INDEX: 32.81 KG/M2 | TEMPERATURE: 98 F | WEIGHT: 204.13 LBS

## 2025-01-02 DIAGNOSIS — C77.1 SECONDARY MALIGNANT NEOPLASM OF MEDIASTINAL LYMPH NODE: ICD-10-CM

## 2025-01-02 DIAGNOSIS — C78.02 SECONDARY MALIGNANT NEOPLASM OF HILUS OF LEFT LUNG: ICD-10-CM

## 2025-01-02 DIAGNOSIS — Z17.1 MALIGNANT NEOPLASM OF UPPER-OUTER QUADRANT OF LEFT BREAST IN FEMALE, ESTROGEN RECEPTOR NEGATIVE: Primary | ICD-10-CM

## 2025-01-02 DIAGNOSIS — C50.412 MALIGNANT NEOPLASM OF UPPER-OUTER QUADRANT OF LEFT BREAST IN FEMALE, ESTROGEN RECEPTOR NEGATIVE: Primary | ICD-10-CM

## 2025-01-02 PROCEDURE — 96413 CHEMO IV INFUSION 1 HR: CPT

## 2025-01-02 PROCEDURE — 63600175 PHARM REV CODE 636 W HCPCS: Mod: JZ,TB | Performed by: INTERNAL MEDICINE

## 2025-01-02 PROCEDURE — 96375 TX/PRO/DX INJ NEW DRUG ADDON: CPT

## 2025-01-02 RX ORDER — HEPARIN 100 UNIT/ML
500 SYRINGE INTRAVENOUS
Status: CANCELLED | OUTPATIENT
Start: 2025-01-02

## 2025-01-02 RX ORDER — ZOLEDRONIC ACID 0.04 MG/ML
4 INJECTION, SOLUTION INTRAVENOUS
Status: COMPLETED | OUTPATIENT
Start: 2025-01-02 | End: 2025-01-02

## 2025-01-02 RX ORDER — SODIUM CHLORIDE 0.9 % (FLUSH) 0.9 %
10 SYRINGE (ML) INJECTION
Status: CANCELLED | OUTPATIENT
Start: 2025-01-02

## 2025-01-02 RX ORDER — HEPARIN 100 UNIT/ML
500 SYRINGE INTRAVENOUS
Status: DISCONTINUED | OUTPATIENT
Start: 2025-01-02 | End: 2025-01-02 | Stop reason: HOSPADM

## 2025-01-02 RX ADMIN — HEPARIN SODIUM (PORCINE) LOCK FLUSH IV SOLN 100 UNIT/ML 500 UNITS: 100 SOLUTION at 11:01

## 2025-01-02 RX ADMIN — PACLITAXEL 200 MG: 100 INJECTION, POWDER, LYOPHILIZED, FOR SUSPENSION INTRAVENOUS at 11:01

## 2025-01-02 RX ADMIN — ZOLEDRONIC ACID 4 MG: 0.04 INJECTION, SOLUTION INTRAVENOUS at 10:01

## 2025-01-02 NOTE — PLAN OF CARE
Patient arrived on unit for Abraxane infusion. Patient is awake, alert, and oriented x4, denies any new complaints or worsening signs and symptoms since last visit. Accessed port, administered Abraxane IVPB over 30 min and Zometa IVPB over 15 min, tolerated well with no signs or symptoms of adverse reaction, flushed, heparin locked and de-accessed port. Patient denies any questions or concerns at this time. Discharged home upon completion of treatments in NAD.    Please see MAR and flowsheets for any additional information.      Problem: Adult Inpatient Plan of Care  Goal: Optimal Comfort and Wellbeing  Outcome: Met

## 2025-01-03 ENCOUNTER — OFFICE VISIT (OUTPATIENT)
Dept: GASTROENTEROLOGY | Facility: CLINIC | Age: 60
End: 2025-01-03
Payer: COMMERCIAL

## 2025-01-03 VITALS
SYSTOLIC BLOOD PRESSURE: 127 MMHG | DIASTOLIC BLOOD PRESSURE: 83 MMHG | HEART RATE: 77 BPM | HEIGHT: 66 IN | WEIGHT: 204.38 LBS | TEMPERATURE: 98 F | OXYGEN SATURATION: 100 % | BODY MASS INDEX: 32.85 KG/M2

## 2025-01-03 DIAGNOSIS — K51.311 ULCERATIVE RECTOSIGMOIDITIS WITH RECTAL BLEEDING: ICD-10-CM

## 2025-01-03 DIAGNOSIS — D84.821 IMMUNODEFICIENCY DUE TO DRUGS: Primary | ICD-10-CM

## 2025-01-03 DIAGNOSIS — K59.09 CHRONIC CONSTIPATION: ICD-10-CM

## 2025-01-03 DIAGNOSIS — Z79.899 IMMUNODEFICIENCY DUE TO DRUGS: Primary | ICD-10-CM

## 2025-01-03 PROCEDURE — 1159F MED LIST DOCD IN RCRD: CPT | Mod: CPTII,S$GLB,, | Performed by: INTERNAL MEDICINE

## 2025-01-03 PROCEDURE — 3074F SYST BP LT 130 MM HG: CPT | Mod: CPTII,S$GLB,, | Performed by: INTERNAL MEDICINE

## 2025-01-03 PROCEDURE — 90471 IMMUNIZATION ADMIN: CPT | Mod: S$GLB,,, | Performed by: INTERNAL MEDICINE

## 2025-01-03 PROCEDURE — 90677 PCV20 VACCINE IM: CPT | Mod: S$GLB,,, | Performed by: INTERNAL MEDICINE

## 2025-01-03 PROCEDURE — 99214 OFFICE O/P EST MOD 30 MIN: CPT | Mod: 25,S$GLB,, | Performed by: INTERNAL MEDICINE

## 2025-01-03 PROCEDURE — 90632 HEPA VACCINE ADULT IM: CPT | Mod: S$GLB,,, | Performed by: INTERNAL MEDICINE

## 2025-01-03 PROCEDURE — 3079F DIAST BP 80-89 MM HG: CPT | Mod: CPTII,S$GLB,, | Performed by: INTERNAL MEDICINE

## 2025-01-03 PROCEDURE — 90472 IMMUNIZATION ADMIN EACH ADD: CPT | Mod: S$GLB,,, | Performed by: INTERNAL MEDICINE

## 2025-01-03 PROCEDURE — 1160F RVW MEDS BY RX/DR IN RCRD: CPT | Mod: CPTII,S$GLB,, | Performed by: INTERNAL MEDICINE

## 2025-01-03 PROCEDURE — 3008F BODY MASS INDEX DOCD: CPT | Mod: CPTII,S$GLB,, | Performed by: INTERNAL MEDICINE

## 2025-01-03 RX ORDER — MULTIVITAMIN WITH IRON
2 TABLET ORAL DAILY
COMMUNITY

## 2025-01-03 NOTE — PROGRESS NOTES
[facility]             Inflammatory Bowel Disease   Follow-up  Note              TODAY'S VISIT DATE:  1/3/2025    Chief Complaint:   Chief Complaint   Patient presents with    Ulcerative rectosigmoiditis     PCP: Lennie, Primary Doctor    Previous History:   Jacki Stone who is a 59 y.o. female seen today at the [facility] for ulcerative colitis  with pertinent past medical history including chronic constipation, stage IV breast cancer and ulcerative colitis. She has struggled with chronic constipation most of her life and has failed multiple prescription meds and non-prescription meds (linzess and trulance caused severe diarrhea, miralax not fully effective) and then medicine that works best for her is Dr. Kline colon 14 day cleanse 2 capsules qod (cascara sagrada dark, pysllium husk, senna leafe, flaxseed powder, lactobicillus, aloe vera, licorice root, MCT oil).  Patient was doing well until 2019 when she began with rectal bleeding and initially thoughout due to hemorrhoids and given cream and then saw 2nd opinion with GI (DR. Marte, in [place], [place]) and proceeded with colonsocopy. Colonoscopy 4/2019 showed moderate to severe inflammation and ulceration in the rectum and distal sigmoid extending from rectum to around 35 cm, proximally the colon is normal. Biopsies proximal sigmoid with rare focus of increased acute and chronic inflammatory cells in the lamina propria and possible adjacent gland damage with scattered pigmented macrophages in the lamina propria c/w melanosis coli.  Distal sigmoid and rectum with active chronic colitis.  Pt started on canasa BID (took this for approximately 1 month) and oral mesalamine 4.8 g/d.  Within a few weeks acute symptoms resolved.  Approximately 3 mos later GI decreased mesalamine to 2.4 g/d and since [id number] took oral mesalamine 1.2 g/d. In 9/2021 patient had colonoscopy c/w normal colon and ICV with biopsies  of right colon c/w ischemic type erosion (david vs nsaid injury), melanosis coli, left colon with melanosis coli, rectum with edema and trace melanosis coli. At f/u visit 9/2021 pt reported continued constipation and in past linzess and trulance caused severe diarrhea and had recently taking mesalamine 1.2 g/bd and also had 2-3 episodes of slight recatl bleeding and rectal pain so self increased back to lialda 2.4 g/d for a few mos and then decreased back to 1.2 g/d.  In 8/2024 4-5 soft BMs qod on the days she takes her 2 capsules of Dr. Kline colon cleanse.  when she takes her colon cleanse treatment (2 capsules qod) and approximately 75% of the stool had bright red on the stool and this prompted a colonoscopy 9/30/24 which was significant for patchy erythema in the left colon and rectum c/w ulcerative colitis vs Crohns' disease and active inflammation in the distal rectum with fairly normal appearing proximal rectum and then again moderate inflammatory changes in the distal sigmoid colon with normal ICV and TI.  Biopsies of right colon c/w florid melanosis coli, left colon with architectural distortion with plasma cellular expansion of the lamina propria, basal lymphoid aggregate, rectum with severe diffuse inflammation. Then oral mesalamine increased to 4 tabs/d (4.8 g/d), canasa qhs x 1 month.  Due to rectal pain and pressure her GI treated her for presumed anal fissure with NTG ointment (taking since 10/24/24) and no headaches and helped some with the pain and pressure.  She continues on lialda 4.8 g/d.    I corresponded with pt's oncologist on 10/31/25 and recommended to increase canasa AR to BID.  Patient was only able to hold in AM dose 50% of the time but able to hold in evening dose every evening. At her initial office visit on 11/14 she reported 10 trips to the toilet of which 50% are soft BMS with BRB on stool and the false trips bloody clots and always blood when she wipes. She has stage IV TNBC (left  breast ER neg) and being treated with abraxane and was previously on keytruda-2024/abraxane.  CT A/P 10/25/24 significant for distal sigmoid/rectal wall thickening. Takes mobic for 1 year for OA. On 24 stool calprotectin 728. During her initial visit she reported difficulties holding in her canasa dose so was advised on alternate times to take it to improve overall adherence. Patient was also advised to stop NTG ointment and the colon cleanse thought to be working against her flare up.    Interval History:  - current IBD meds: Lialda (mesalamine) 4.8 g/d (started 2019) + uceris foam BID (started 25)  - recent meds: canasa 100mg BID (started QHS, increased to BID 10/31/24, stopped 24); colon cleanse (stopped 10 days)   - 1-2 soft Bowel Movements/day, blood in the stool a few times a week; no mucus; no abdominal pain  - denies fake trips   - reports improvement in symptoms started 1 week ago  - stool calprotectin: 24 (728); 24 (444); 24 (551)  - on chemo - cycle 7 just started last week takes weekly x 3 weeks then 3 weeks off ; PET scan scheduled   - NSAID use: meloxicam for arthritis - started a year ago   - Narcotic use: No   - Alternative/complementary meds for IBD: No     Prior Pertinent Surgeries:   None     Last pertinent Endoscopy/Imagin/24 colonoscopy:  patchy erythema in the left colon and rectum c/w ulcerative colitis vs Crohns' disease and active inflammation in the distal rectum with fairly normal appearing proximal rectum and then again moderate inflammatory changes in the distal sigmoid colon with normal ICV and TI.  Biopsies of right colon c/w florid melanosis coli, left colon with architectural distortion with plasma cellular expansion of the lamina propria, basal lymphoid aggregate, rectum with severe diffuse inflammation  10/25/24 CT A/P:  distal sigmoid/rectal wall thickening     Therapeutic Drug Monitoring Labs:  NA     Prior IBD  Therapies:  None    Vaccinations:  Lab Results   Component Value Date    HEPBSURFABQU Negative 11/14/2024    HEPBSURFABQU <3 11/14/2024     Lab Results   Component Value Date    HEPAIGG Non-reactive 11/14/2024     Lab Results   Component Value Date    VARICELLAZOS 28.800 11/14/2024    VARICELLAINT Positive (A) 11/14/2024     Lab Results   Component Value Date    MUMPSIGGSCRE 88.10 11/14/2024    MUMPSIGGINTE Positive 11/14/2024      Lab Results   Component Value Date    RUBEOLAIGGAN >300.00 11/14/2024    RUBEOLAINTER Positive 11/14/2024     Immunization History   Administered Date(s) Administered    COVID-19, mRNA, LNP-S, PF (Moderna) Ages 12+ 09/07/2024    Hepatitis A, Adult 01/03/2025    Influenza 10/26/2009    Influenza - Quadrivalent 10/01/2015, 09/11/2023    Influenza - Trivalent - Fluarix, Flulaval, Fluzone, Afluria - PF 09/07/2024    Pneumococcal Conjugate - 13 Valent 07/08/2015    Pneumococcal Conjugate - 20 Valent 01/03/2025    Tdap 07/08/2015    Zoster Recombinant 07/05/2019, 01/03/2020   Flu shot: recommended yearly. UTD   COVID vaccine/booster:  per CDC recommendations.   RSV: discussed and recommended to get at local pharmacy   Tetanus (Tdap):  recommended every 10 years, next due 7/2025. Discussed and recommended to get at local pharmacy   PCV 20: discussed and recommended today. Administered during visit   HPV: N/A  Meningococcal: N/A  Hepatitis B: not immune. Discussed and recommended Heplisav B 2 dose series 1 month apart. Pt will get at [facility]   Hepatitis A:   not immune. Discussed and recommended Havrix 2 doses series 6-12 months apart. Administered dose #1 today. Dose #2 with future OV in 6-12 months  MMR (live vaccine): immune       Chickenpox status/Varicella (live vaccine):  immune  Shingrix: UTD    Review of Systems   Constitutional:  Negative for chills, fever and weight loss.   HENT:          No oral ulcers, dysphagia, oral thrush   Eyes:  Negative for blurred vision, pain and  redness.   Respiratory:  Negative for cough and shortness of breath.    Cardiovascular:  Negative for chest pain.   Gastrointestinal:  Negative for abdominal pain, heartburn, nausea and vomiting.   Genitourinary:  Negative for dysuria and hematuria.   Musculoskeletal:  Negative for back pain and joint pain.   Skin:  Negative for rash.   Psychiatric/Behavioral:  Negative for depression. The patient is not nervous/anxious and does not have insomnia.      All Medical History/Surgical History/Family History/Social History/Allergies have been reviewed and updated in EMR    Outpatient Medications Marked as Taking for the 1/3/25 encounter (Office Visit) with Alphonse Sumner MD   Medication Sig Dispense Refill    budesonide (UCERIS) 2 mg/actuation Foam Place 2 mg rectally 2 (two) times daily. 1 pump twice daily for 4 weeks 133.6 g 1    calcium carbonate (CALCIUM 500 ORAL) Take by mouth once daily.      cholecalciferol, vitamin D3, (VITAMIN D3) 125 mcg (5,000 unit) Tab Take 5,000 Units by mouth once daily.      ezetimibe (ZETIA) 10 mg tablet Take 10 mg by mouth once daily.      hydroCHLOROthiazide (HYDRODIURIL) 25 MG tablet Take 25 mg by mouth once daily.      levothyroxine (SYNTHROID) 112 MCG tablet Take 1 tablet by mouth once daily.      meloxicam (MOBIC) 15 MG tablet Take 15 mg by mouth once daily.      mesalamine (LIALDA) 1.2 gram TbEC Take 4 tablets (4.8 g total) by mouth once daily. 60 tablet 5    omega-3 fatty acids/fish oil (FISH OIL-OMEGA-3 FATTY ACIDS) 300-1,000 mg capsule Take 2 capsules by mouth once daily.      potassium chloride SA (K-DUR,KLOR-CON M) 10 MEQ tablet Take 2 tablets (20 mEq total) by mouth once daily. for 365 doses 180 tablet 3    prochlorperazine (COMPAZINE) 10 MG tablet Take 10 mg by mouth every 12 (twelve) hours as needed.      venlafaxine (EFFEXOR-XR) 75 MG 24 hr capsule Take 225 mg by mouth once daily.      zoledronic acid (ZOMETA IV) Inject into the vein every 28 days.      ZYPREXA 5 mg  "tablet Take 10 mg by mouth once daily.       Current Facility-Administered Medications for the 1/3/25 encounter (Office Visit) with Alphonse Sumner MD   Medication Dose Route Frequency Provider Last Rate Last Admin    [COMPLETED] Hep A (Havrix) IM vaccine (>/= 18 yo)  1,440 Units Intramuscular 1 time in Clinic/Alphonse Jones MD   1,440 Units at 01/03/25 1112    Hep A (Havrix) IM vaccine (>/= 18 yo)  1,440 Units Intramuscular 1 time in Clinic/[facility] Alphonse Sumner MD        [COMPLETED] pneumoc 20-dinesh conj-dip cr(PF) (PREVNAR-20 (PF)) injection Syrg 0.5 mL  0.5 mL Intramuscular 1 time in Clinic/[facility] Alphonse Sumner MD   0.5 mL at 01/03/25 1113    pneumoc 20-dinesh conj-dip cr(PF) (PREVNAR-20 (PF)) injection Syrg 0.5 mL  0.5 mL Intramuscular 1 time in Clinic/[facility] Alphonse Sumner MD           Vital Signs:  /83 (BP Location: Right arm, Patient Position: Sitting)   Pulse 77   Temp 98.2 °F (36.8 °C) (Temporal)   Ht 5' 6" (1.676 m)   Wt 92.7 kg (204 lb 5.9 oz)   LMP  (LMP Unknown)   SpO2 100%   BMI 32.99 kg/m²    Physical Exam  Abdominal:      General: There is no distension.      Palpations: Abdomen is soft.      Tenderness: There is no abdominal tenderness.     Labs:   Lab Results   Component Value Date    CRP 16.1 (H) 11/14/2024    CALPROTECTIN 551.0 (H) 12/17/2024     Lab Results   Component Value Date    HEPBSAG Non-reactive 11/14/2024    HEPBCAB Non-reactive 11/14/2024     Lab Results   Component Value Date    TBGOLDPLUS Negative 11/14/2024     Lab Results   Component Value Date    NYUZRTQR96BB 72 11/14/2024    NLYICBWF91 1091 (H) 11/14/2024     Lab Results   Component Value Date    WBC 4.60 12/30/2024    HGB 12.1 12/30/2024    HCT 36.5 (L) 12/30/2024    MCV 97 12/30/2024     12/30/2024     Lab Results   Component Value Date    CREATININE 0.9 12/30/2024    ALBUMIN 3.7 12/30/2024    BILITOT 0.3 12/30/2024    ALKPHOS 91 12/30/2024    AST 25 12/30/2024    ALT 22 12/30/2024 "     Assessment/Plan:  Jacki Stone is a 59 y.o. 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     Patient reports significant improvement of her symptoms in the last week after recently stopping the colon cleanse and being adherent to lialda and canasa suppositories BID for the past 6-8 weeks. Her stool calprotectin improved initially after two weeks of canasa suppositories, but slightly worsened again when she completed 1 month of topical treatment. Unclear if the colon cleanse could have affected the stool kaushik results, therefore will repeat a stool calprotectin now to see if it correlates with patients improved symptoms. Recommended she continues alternating uceris foam and canasa suppositories daily in AM and PM. Will repeat a stool calprotectin in 2 weeks to assess disease activity. Patient advised to continue lialda daily in the meantime. Depending on stool calprotectin results in two weeks will decide next steps on recommendations regarding topical treatment.      # Ulcerative colitis (proctosigmoiditis):    - note:  breast cancer, will coordinate with oncologist and keep this in mind when considering any IBD meds - currently on cycle 7 of chemo (long term treatment)  - continue lialda (mesalamine) 4.8 g/d  - continue uceris foam in the AM and canasa suppositories in the PM  - continue to hold colon cleanse capsules  - stool calprotectin-now then will repeat after 2 weeks of taking uceris foam and canasa suppositories daily   - colonoscopy - to be determined at next visit   - drug monitoring labs: CBC/CMP yearly (10/2025) TPMT (not indicated, no plans for , TB quantiferon (neg 11/2024), Hep B testing (HBsAg, HBtotalcoreAb) (neg 11/2024)     # Chronic constipation-resolved:  - failed linzess, trulance- severe diarrhea  - stopped colon cleanse     #Immunodeficiency due to long term immunosuppressive drug therapy and IBD specific health  maintenance:  CRC risk- sx 2019, <1/3 colon involved, average risk  Skin exam- yearly  Osteopenia- calcium 1500 mg/d, zometa  Pap smear- defer to gyn/onc  Vitamin D- high 11/2024, on vit D3 5000 IU/d- defer to PCP/onc  Vaccines- no live vaccines, PCV20 and hep A #1 today, second dose with future visit in 6-12 mo; Hep B, RSV and tdap at local pharmacy     I personally examined the patient and discussed above plan in collaboration with Mari Sanchez, PharmD.      Visit today is associated with current or anticipated ongoing medical care related to this patient's single serious condition/complex condition ulcerative proctosigmoiditis.     Follow up in about 3 months (around 4/3/2025).    Alphonse Sumner MD  Department of Gastroenterology  Medical Director, Inflammatory Bowel Disease

## 2025-01-03 NOTE — PATIENT INSTRUCTIONS
- continue lialda 4 tablets a day   - turn in stool calprotectin now  - continue taking uceris foam in the AM and canasa suppositories in the PM  - after 2 weeks of suppositories and foam will repeat another stool calprotectin   - pneumonia vaccine and hepatitis A dose #1 today  - get hepatitis B vaccine (heplisav B) at [facility] in the [facility]  - get RSV vaccine at local pharmacy; tetanus vaccine in July 2025 at local pharmacy

## 2025-01-06 ENCOUNTER — LAB VISIT (OUTPATIENT)
Dept: LAB | Facility: HOSPITAL | Age: 60
End: 2025-01-06
Attending: INTERNAL MEDICINE
Payer: COMMERCIAL

## 2025-01-06 DIAGNOSIS — C50.412 MALIGNANT NEOPLASM OF UPPER-OUTER QUADRANT OF LEFT BREAST IN FEMALE, ESTROGEN RECEPTOR NEGATIVE: ICD-10-CM

## 2025-01-06 DIAGNOSIS — D84.821 IMMUNODEFICIENCY DUE TO DRUGS: ICD-10-CM

## 2025-01-06 DIAGNOSIS — Z17.1 MALIGNANT NEOPLASM OF UPPER-OUTER QUADRANT OF LEFT BREAST IN FEMALE, ESTROGEN RECEPTOR NEGATIVE: ICD-10-CM

## 2025-01-06 DIAGNOSIS — Z79.899 IMMUNODEFICIENCY DUE TO DRUGS: ICD-10-CM

## 2025-01-06 DIAGNOSIS — K51.311 ULCERATIVE RECTOSIGMOIDITIS WITH RECTAL BLEEDING: ICD-10-CM

## 2025-01-06 LAB
ALBUMIN SERPL BCP-MCNC: 3.8 G/DL (ref 3.5–5.2)
ALP SERPL-CCNC: 85 U/L (ref 40–150)
ALT SERPL W/O P-5'-P-CCNC: 26 U/L (ref 10–44)
ANION GAP SERPL CALC-SCNC: 6 MMOL/L (ref 8–16)
AST SERPL-CCNC: 27 U/L (ref 10–40)
BASOPHILS # BLD AUTO: 0.03 K/UL (ref 0–0.2)
BASOPHILS NFR BLD: 0.8 % (ref 0–1.9)
BILIRUB SERPL-MCNC: 0.4 MG/DL (ref 0.1–1)
BUN SERPL-MCNC: 12 MG/DL (ref 6–20)
CALCIUM SERPL-MCNC: 9.3 MG/DL (ref 8.7–10.5)
CHLORIDE SERPL-SCNC: 99 MMOL/L (ref 95–110)
CO2 SERPL-SCNC: 28 MMOL/L (ref 23–29)
CREAT SERPL-MCNC: 0.8 MG/DL (ref 0.5–1.4)
DIFFERENTIAL METHOD BLD: ABNORMAL
EOSINOPHIL # BLD AUTO: 0 K/UL (ref 0–0.5)
EOSINOPHIL NFR BLD: 0 % (ref 0–8)
ERYTHROCYTE [DISTWIDTH] IN BLOOD BY AUTOMATED COUNT: 13.7 % (ref 11.5–14.5)
EST. GFR  (NO RACE VARIABLE): >60 ML/MIN/1.73 M^2
GLUCOSE SERPL-MCNC: 77 MG/DL (ref 70–110)
HCT VFR BLD AUTO: 35 % (ref 37–48.5)
HGB BLD-MCNC: 11.5 G/DL (ref 12–16)
IMM GRANULOCYTES # BLD AUTO: 0.01 K/UL (ref 0–0.04)
IMM GRANULOCYTES NFR BLD AUTO: 0.3 % (ref 0–0.5)
LYMPHOCYTES # BLD AUTO: 1.2 K/UL (ref 1–4.8)
LYMPHOCYTES NFR BLD: 29.3 % (ref 18–48)
MCH RBC QN AUTO: 32 PG (ref 27–31)
MCHC RBC AUTO-ENTMCNC: 32.9 G/DL (ref 32–36)
MCV RBC AUTO: 98 FL (ref 82–98)
MONOCYTES # BLD AUTO: 0.3 K/UL (ref 0.3–1)
MONOCYTES NFR BLD: 7.3 % (ref 4–15)
NEUTROPHILS # BLD AUTO: 2.5 K/UL (ref 1.8–7.7)
NEUTROPHILS NFR BLD: 62.3 % (ref 38–73)
NRBC BLD-RTO: 0 /100 WBC
PLATELET # BLD AUTO: 182 K/UL (ref 150–450)
PMV BLD AUTO: 7.8 FL (ref 9.2–12.9)
POTASSIUM SERPL-SCNC: 3.9 MMOL/L (ref 3.5–5.1)
PROT SERPL-MCNC: 7.4 G/DL (ref 6–8.4)
RBC # BLD AUTO: 3.59 M/UL (ref 4–5.4)
SODIUM SERPL-SCNC: 133 MMOL/L (ref 136–145)
WBC # BLD AUTO: 4 K/UL (ref 3.9–12.7)

## 2025-01-06 PROCEDURE — 36415 COLL VENOUS BLD VENIPUNCTURE: CPT | Performed by: INTERNAL MEDICINE

## 2025-01-06 PROCEDURE — 83993 ASSAY FOR CALPROTECTIN FECAL: CPT | Performed by: INTERNAL MEDICINE

## 2025-01-06 PROCEDURE — 80053 COMPREHEN METABOLIC PANEL: CPT | Performed by: INTERNAL MEDICINE

## 2025-01-06 PROCEDURE — 85025 COMPLETE CBC W/AUTO DIFF WBC: CPT | Performed by: INTERNAL MEDICINE

## 2025-01-07 ENCOUNTER — OFFICE VISIT (OUTPATIENT)
Dept: HEMATOLOGY/ONCOLOGY | Facility: CLINIC | Age: 60
End: 2025-01-07
Payer: COMMERCIAL

## 2025-01-07 VITALS
TEMPERATURE: 98 F | OXYGEN SATURATION: 99 % | RESPIRATION RATE: 18 BRPM | BODY MASS INDEX: 32.47 KG/M2 | WEIGHT: 202 LBS | HEIGHT: 66 IN | HEART RATE: 89 BPM | SYSTOLIC BLOOD PRESSURE: 127 MMHG | DIASTOLIC BLOOD PRESSURE: 75 MMHG

## 2025-01-07 DIAGNOSIS — C50.919 CARCINOMA OF BREAST METASTATIC TO BONE, UNSPECIFIED LATERALITY: ICD-10-CM

## 2025-01-07 DIAGNOSIS — K51.211 ULCERATIVE PROCTITIS WITH RECTAL BLEEDING: ICD-10-CM

## 2025-01-07 DIAGNOSIS — C77.1 SECONDARY MALIGNANT NEOPLASM OF MEDIASTINAL LYMPH NODE: ICD-10-CM

## 2025-01-07 DIAGNOSIS — Z17.1 MALIGNANT NEOPLASM OF UPPER-OUTER QUADRANT OF LEFT BREAST IN FEMALE, ESTROGEN RECEPTOR NEGATIVE: Primary | ICD-10-CM

## 2025-01-07 DIAGNOSIS — C50.412 MALIGNANT NEOPLASM OF UPPER-OUTER QUADRANT OF LEFT BREAST IN FEMALE, ESTROGEN RECEPTOR NEGATIVE: Primary | ICD-10-CM

## 2025-01-07 DIAGNOSIS — C79.51 CARCINOMA OF BREAST METASTATIC TO BONE, UNSPECIFIED LATERALITY: ICD-10-CM

## 2025-01-07 DIAGNOSIS — C78.02 SECONDARY MALIGNANT NEOPLASM OF HILUS OF LEFT LUNG: ICD-10-CM

## 2025-01-07 PROCEDURE — 99999 PR PBB SHADOW E&M-EST. PATIENT-LVL IV: CPT | Mod: PBBFAC,,, | Performed by: NURSE PRACTITIONER

## 2025-01-07 RX ORDER — EPINEPHRINE 0.3 MG/.3ML
0.3 INJECTION SUBCUTANEOUS ONCE AS NEEDED
Status: CANCELLED | OUTPATIENT
Start: 2025-01-08

## 2025-01-07 RX ORDER — PROCHLORPERAZINE EDISYLATE 5 MG/ML
10 INJECTION INTRAMUSCULAR; INTRAVENOUS ONCE AS NEEDED
Status: CANCELLED
Start: 2025-01-08

## 2025-01-07 RX ORDER — SODIUM CHLORIDE 0.9 % (FLUSH) 0.9 %
10 SYRINGE (ML) INJECTION
Status: CANCELLED | OUTPATIENT
Start: 2025-01-08

## 2025-01-07 RX ORDER — DIPHENHYDRAMINE HYDROCHLORIDE 50 MG/ML
50 INJECTION INTRAMUSCULAR; INTRAVENOUS ONCE AS NEEDED
Status: CANCELLED | OUTPATIENT
Start: 2025-01-08

## 2025-01-07 RX ORDER — HEPARIN 100 UNIT/ML
500 SYRINGE INTRAVENOUS
Status: CANCELLED | OUTPATIENT
Start: 2025-01-08

## 2025-01-07 NOTE — PROGRESS NOTES
"  [facility]/ [facility]   at [facility] Note:      Chief Complaint:   Encounter Diagnoses   Name Primary?    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 female who presents today for follow up for stage IV TNBC. She presents today for a follow up for cycle #7 D8  She has been doing well on abraxane. Has mild hair loss  She has no new complaints today. Colitis is much better controlled with improved stool and decreased blood    Eating and drinking well  Using a rectal foam from Dr. Sumner, had some mild rectal bleeding that started today,but overall it has improved  Bowels stable  No new pain  No fever or s/s of infection  No neuropathy  No sob or chest pain  Mild fatigue    Per Dr. Anderson's note:   Oncology History   Patient with TNBC diagnosed in 2022 in [place], [place]   Routine screening ultrasound demonstrated 11mm mass in the L breast   Biopsy 9/30/22: IDC, grade 3; ER-/NE-/HER2 0; PDL1 CPS <10    NACT with weekly carboplatin/paclitaxel x 12  Bilateral mastectomy March 2022: residual IDC, 1.7mm; 0 LN+  Adjuvant AC x 4 completed 5/23/23    Routine PET imaging without new disease  Follow up June 2024 demonstrated "elevated tumor markers" with high  and LDH  PET 6/11/24: bilateral hilar and mediastinal LAD, 2.7cm KRIS mass  Bronchoscopy 6/18/24: metastatic " carcinoma 4R/11L c/w breast carcinoma     Started Keytruda/Abraxane 6/20/24, dropped Keytruda given no PDL1  Repeat 22C3 showed CPS > 10   October imaging stable     Of note: diagnosed UC 2019 in [place]     Patient Active Problem List   Diagnosis    Malignant neoplasm of upper-outer quadrant of left breast in female, estrogen receptor negative    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    Secondary malignant neoplasm of mediastinal lymph node    Ulcerative rectosigmoiditis with rectal bleeding    Chronic constipation    Immunodeficiency due to drugs       Current Outpatient Medications   Medication Instructions    ABRYSVO (PF) 120 mcg, Intramuscular, Once    budesonide (UCERIS) 2 mg, Rectal, 2 times daily, 1 pump twice daily for 4 weeks    calcium carbonate (CALCIUM 500 ORAL) Daily    cholecalciferol (vitamin D3) (VITAMIN D3) 5,000 Units, Daily    ezetimibe (ZETIA) 10 mg, Daily    hepatitis B vacc-CpG 1018, PF, 20 mcg/0.5 mL Syrg inject 0.5ml into the right arm    hydroCHLOROthiazide (HYDRODIURIL) 25 mg, Daily    levothyroxine (SYNTHROID) 112 MCG tablet 1 tablet, Daily    meloxicam (MOBIC) 15 mg, Daily    mesalamine (CANASA) 1,000 mg, Rectal, 2 times daily    mesalamine (LIALDA) 4.8 g, Oral, Daily    omega-3 fatty acids/fish oil (FISH OIL-OMEGA-3 FATTY ACIDS) 300-1,000 mg capsule 2 capsules, Daily    potassium chloride SA (K-DUR,KLOR-CON M) 10 MEQ tablet 20 mEq, Oral, Daily    prochlorperazine (COMPAZINE) 10 mg, Every 12 hours PRN    venlafaxine (EFFEXOR-XR) 225 mg, Daily    zoledronic acid (ZOMETA IV) Every 28 days    ZyPREXA 10 mg, Daily       Review of Systems:   Review of Systems   Constitutional:  Positive for malaise/fatigue.        Mild fatigue, able to do adls   HENT: Negative.     Respiratory: Negative.  Negative for cough and shortness of breath.    Cardiovascular: Negative.  Negative for chest pain.   Gastrointestinal:  Negative for abdominal pain, constipation, diarrhea and nausea.    Genitourinary:  Negative for frequency.   Musculoskeletal: Negative.  Negative for back pain.   Skin:  Negative for rash.   Neurological: Negative.  Negative for headaches.   Psychiatric/Behavioral:  The patient is not nervous/anxious.    All other systems reviewed and are negative.    Vitals:    01/07/25 1357   BP: 127/75   Pulse: 89   Resp: 18   Temp: 98 °F (36.7 °C)     Physical Exam  Constitutional:       General: She is not in acute distress.     Appearance: Normal appearance. She is not ill-appearing.   HENT:      Head: Normocephalic and atraumatic.   Eyes:      Extraocular Movements: Extraocular movements intact.   Cardiovascular:      Rate and Rhythm: Normal rate and regular rhythm.      Pulses: Normal pulses.      Heart sounds: Normal heart sounds. No murmur heard.     No friction rub.   Pulmonary:      Effort: Pulmonary effort is normal. No respiratory distress.      Breath sounds: Normal breath sounds.   Abdominal:      General: Bowel sounds are normal. There is no distension.      Palpations: Abdomen is soft. There is no mass.      Tenderness: There is no abdominal tenderness.   Musculoskeletal:         General: Normal range of motion.   Lymphadenopathy:      Cervical: No cervical adenopathy.   Skin:     General: Skin is warm and dry.   Neurological:      General: No focal deficit present.      Mental Status: She is alert and oriented to person, place, and time.       Assessment & Plan:  1. Malignant neoplasm of upper-outer quadrant of left breast in female, estrogen receptor negative    2. 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 Secondary malignant neoplasm of mediastinal lymph node    4. Carcinoma of breast metastatic to bone, unspecified laterality    5. Ulcerative proctitis with rectal bleeding      Patient with stage IV TNBC on abraxane  Previously receiving Keytruda/Abraxane but review of records show PDL1 CPS <10 on lung assay  While pending 22C3 testing, continued weekly  abraxane 3 on/ 1 off. Repeat 22C3 testing shows CPS >10.   Repeat imaging October 2024 shows stable size and improved SUV in index bone lesions. Artifact on imaging obscures lung nodule.   Worsening ulcerative proctitis, which precedes cancer diagnosis. Seen by Dr Sumner with recommendations (this has been improving)    Will plan to continue abraxane and then repeat PET imaging for treatment assessment in mid-January (1/22/24)  If POD, plan to add in Keytruda to see if there is improvement     Labs reviewed and acceptable for her abraxane tomorrow    Zometa every 4 weeks - last dose 1/2/25    Return to clinic in 2 weeks with MD appointment and labs.     Route Chart for Scheduling    Med Onc Chart Routing  Urgent    Follow up with physician . Scheduled   Follow up with LETY . On 2/4 with lety   Infusion scheduling note   abraxane three weeks on, one week off (please schedule next few), gets on [facility]   Injection scheduling note zometa every 4 weeks   Labs CBC and CMP   Scheduling:  Preferred lab:  Lab interval:  three weeks on, one week off  (needs scheduled for 1/14)   Imaging   Pet scan already scheduled   Pharmacy appointment    Other referrals                  Treatment Plan Information   OP BREAST NAB-PACLITAXEL WEEKLY Renetta Anderson MD   Associated diagnosis: Malignant neoplasm of upper-outer quadrant of left breast in female, estrogen receptor negative Stage IV rcTX, cNX, pM1, G3, ER-, NV-, HER2- noted on 7/16/2024  Associated diagnosis: 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   noted on 7/16/2024  Associated diagnosis: Secondary malignant neoplasm of mediastinal lymph node   noted on 7/16/2024   Line of treatment: First Line  Treatment Goal: Palliative     Upcoming Treatment Dates - OP BREAST NAB-PACLITAXEL WEEKLY    1/9/2025       Chemotherapy       PACLitaxeL protein-bound (ABRAXANE) 100 mg/m2 = 210 mg in 42 mL infusion  1/16/2025       Chemotherapy       PACLitaxeL protein-bound (ABRAXANE) 100  mg/m2 = 210 mg in 42 mL infusion    Supportive Plan Information  OP ZOLEDRONIC ACID (ZOMETA) Q4W Renetta Anderson MD   Associated Diagnosis: Malignant neoplasm of upper-outer quadrant of left breast in female, estrogen receptor negative Stage IV rcTX, cNX, pM1, G3, ER-, ME-, HER2- noted on 7/16/2024   Line of treatment: Supportive Care   Treatment goal: Palliative     Upcoming Treatment Dates - OP ZOLEDRONIC ACID (ZOMETA) Q4W    1/29/2025       Medications       zoledronic 4 mg/100 mL infusion 4 mg  2/26/2025       Medications       zoledronic 4 mg/100 mL infusion 4 mg  3/26/2025       Medications       zoledronic 4 mg/100 mL infusion 4 mg  4/23/2025       Medications       zoledronic 4 mg/100 mL infusion 4 mg    MDM includes  :    - Acute or chronic illness or injury that poses a threat to life or bodily function  - Independent review and explanation of 3+ results from unique tests  - Discussion of management and ordering 3+ unique tests  - Extensive discussion of treatment and management  - Prescription drug management  - Drug therapy requiring intensive monitoring for toxicity    Jacquelyn Apodaca NP   01/07/2025    Answers submitted by the patient for this visit:  Review of Systems Questionnaire (Submitted on 1/4/2025)  appetite change : No  unexpected weight change: No  mouth sores: No  visual disturbance: No  adenopathy: No

## 2025-01-08 ENCOUNTER — INFUSION (OUTPATIENT)
Dept: INFUSION THERAPY | Facility: HOSPITAL | Age: 60
End: 2025-01-08
Attending: INTERNAL MEDICINE
Payer: COMMERCIAL

## 2025-01-08 VITALS
SYSTOLIC BLOOD PRESSURE: 122 MMHG | RESPIRATION RATE: 18 BRPM | HEART RATE: 73 BPM | DIASTOLIC BLOOD PRESSURE: 67 MMHG | OXYGEN SATURATION: 99 % | TEMPERATURE: 98 F

## 2025-01-08 DIAGNOSIS — Z17.1 MALIGNANT NEOPLASM OF UPPER-OUTER QUADRANT OF LEFT BREAST IN FEMALE, ESTROGEN RECEPTOR NEGATIVE: Primary | ICD-10-CM

## 2025-01-08 DIAGNOSIS — C77.1 SECONDARY MALIGNANT NEOPLASM OF MEDIASTINAL LYMPH NODE: ICD-10-CM

## 2025-01-08 DIAGNOSIS — C50.412 MALIGNANT NEOPLASM OF UPPER-OUTER QUADRANT OF LEFT BREAST IN FEMALE, ESTROGEN RECEPTOR NEGATIVE: Primary | ICD-10-CM

## 2025-01-08 DIAGNOSIS — C78.02 SECONDARY MALIGNANT NEOPLASM OF HILUS OF LEFT LUNG: ICD-10-CM

## 2025-01-08 LAB — CALPROTECTIN STL-MCNT: 298 MCG/G

## 2025-01-08 PROCEDURE — 63600175 PHARM REV CODE 636 W HCPCS: Mod: JZ,TB | Performed by: NURSE PRACTITIONER

## 2025-01-08 PROCEDURE — 96413 CHEMO IV INFUSION 1 HR: CPT

## 2025-01-08 RX ORDER — HEPARIN 100 UNIT/ML
500 SYRINGE INTRAVENOUS
Status: DISCONTINUED | OUTPATIENT
Start: 2025-01-08 | End: 2025-01-08 | Stop reason: HOSPADM

## 2025-01-08 RX ADMIN — HEPARIN 500 UNITS: 100 SYRINGE at 10:01

## 2025-01-08 RX ADMIN — PACLITAXEL 200 MG: 100 INJECTION, POWDER, LYOPHILIZED, FOR SUSPENSION INTRAVENOUS at 10:01

## 2025-01-08 NOTE — PLAN OF CARE
Pt arrived to unit ambulatory, alert and oriented. Pt reports no new or worsening symptoms at this time. Pt received Abraxane over 30 minutes with no S&S of complications. Pt discharged off unit in [facility].

## 2025-01-14 ENCOUNTER — LAB VISIT (OUTPATIENT)
Dept: LAB | Facility: HOSPITAL | Age: 60
End: 2025-01-14
Attending: INTERNAL MEDICINE
Payer: COMMERCIAL

## 2025-01-14 DIAGNOSIS — C50.412 MALIGNANT NEOPLASM OF UPPER-OUTER QUADRANT OF LEFT BREAST IN FEMALE, ESTROGEN RECEPTOR NEGATIVE: ICD-10-CM

## 2025-01-14 DIAGNOSIS — Z17.1 MALIGNANT NEOPLASM OF UPPER-OUTER QUADRANT OF LEFT BREAST IN FEMALE, ESTROGEN RECEPTOR NEGATIVE: ICD-10-CM

## 2025-01-14 LAB
ALBUMIN SERPL BCP-MCNC: 3.9 G/DL (ref 3.5–5.2)
ALP SERPL-CCNC: 79 U/L (ref 40–150)
ALT SERPL W/O P-5'-P-CCNC: 23 U/L (ref 10–44)
ANION GAP SERPL CALC-SCNC: 8 MMOL/L (ref 8–16)
AST SERPL-CCNC: 23 U/L (ref 10–40)
BASOPHILS # BLD AUTO: 0.03 K/UL (ref 0–0.2)
BASOPHILS NFR BLD: 1 % (ref 0–1.9)
BILIRUB SERPL-MCNC: 0.4 MG/DL (ref 0.1–1)
BUN SERPL-MCNC: 8 MG/DL (ref 6–20)
CALCIUM SERPL-MCNC: 9.3 MG/DL (ref 8.7–10.5)
CHLORIDE SERPL-SCNC: 99 MMOL/L (ref 95–110)
CO2 SERPL-SCNC: 28 MMOL/L (ref 23–29)
CREAT SERPL-MCNC: 0.8 MG/DL (ref 0.5–1.4)
DIFFERENTIAL METHOD BLD: ABNORMAL
EOSINOPHIL # BLD AUTO: 0 K/UL (ref 0–0.5)
EOSINOPHIL NFR BLD: 0.3 % (ref 0–8)
ERYTHROCYTE [DISTWIDTH] IN BLOOD BY AUTOMATED COUNT: 13.7 % (ref 11.5–14.5)
EST. GFR  (NO RACE VARIABLE): >60 ML/MIN/1.73 M^2
GLUCOSE SERPL-MCNC: 128 MG/DL (ref 70–110)
HCT VFR BLD AUTO: 33.5 % (ref 37–48.5)
HGB BLD-MCNC: 11.3 G/DL (ref 12–16)
IMM GRANULOCYTES # BLD AUTO: 0.02 K/UL (ref 0–0.04)
IMM GRANULOCYTES NFR BLD AUTO: 0.6 % (ref 0–0.5)
LYMPHOCYTES # BLD AUTO: 1.2 K/UL (ref 1–4.8)
LYMPHOCYTES NFR BLD: 37.7 % (ref 18–48)
MCH RBC QN AUTO: 32.2 PG (ref 27–31)
MCHC RBC AUTO-ENTMCNC: 33.7 G/DL (ref 32–36)
MCV RBC AUTO: 95 FL (ref 82–98)
MONOCYTES # BLD AUTO: 0.2 K/UL (ref 0.3–1)
MONOCYTES NFR BLD: 6.8 % (ref 4–15)
NEUTROPHILS # BLD AUTO: 1.7 K/UL (ref 1.8–7.7)
NEUTROPHILS NFR BLD: 53.6 % (ref 38–73)
NRBC BLD-RTO: 0 /100 WBC
PLATELET # BLD AUTO: 256 K/UL (ref 150–450)
PMV BLD AUTO: 7.7 FL (ref 9.2–12.9)
POTASSIUM SERPL-SCNC: 3.5 MMOL/L (ref 3.5–5.1)
PROT SERPL-MCNC: 7.3 G/DL (ref 6–8.4)
RBC # BLD AUTO: 3.51 M/UL (ref 4–5.4)
SODIUM SERPL-SCNC: 135 MMOL/L (ref 136–145)
WBC # BLD AUTO: 3.1 K/UL (ref 3.9–12.7)

## 2025-01-14 PROCEDURE — 85025 COMPLETE CBC W/AUTO DIFF WBC: CPT | Performed by: INTERNAL MEDICINE

## 2025-01-14 PROCEDURE — 80053 COMPREHEN METABOLIC PANEL: CPT | Performed by: INTERNAL MEDICINE

## 2025-01-14 PROCEDURE — 36415 COLL VENOUS BLD VENIPUNCTURE: CPT | Performed by: INTERNAL MEDICINE

## 2025-01-14 RX ORDER — EPINEPHRINE 0.3 MG/.3ML
0.3 INJECTION SUBCUTANEOUS ONCE AS NEEDED
Status: CANCELLED | OUTPATIENT
Start: 2025-01-15

## 2025-01-14 RX ORDER — SODIUM CHLORIDE 0.9 % (FLUSH) 0.9 %
10 SYRINGE (ML) INJECTION
Status: CANCELLED | OUTPATIENT
Start: 2025-01-15

## 2025-01-14 RX ORDER — PROCHLORPERAZINE EDISYLATE 5 MG/ML
10 INJECTION INTRAMUSCULAR; INTRAVENOUS ONCE AS NEEDED
Status: CANCELLED
Start: 2025-01-15

## 2025-01-14 RX ORDER — DIPHENHYDRAMINE HYDROCHLORIDE 50 MG/ML
50 INJECTION INTRAMUSCULAR; INTRAVENOUS ONCE AS NEEDED
Status: CANCELLED | OUTPATIENT
Start: 2025-01-15

## 2025-01-14 RX ORDER — HEPARIN 100 UNIT/ML
500 SYRINGE INTRAVENOUS
Status: CANCELLED | OUTPATIENT
Start: 2025-01-15

## 2025-01-15 ENCOUNTER — INFUSION (OUTPATIENT)
Dept: INFUSION THERAPY | Facility: HOSPITAL | Age: 60
End: 2025-01-15
Attending: INTERNAL MEDICINE
Payer: COMMERCIAL

## 2025-01-15 VITALS
WEIGHT: 201.25 LBS | BODY MASS INDEX: 32.49 KG/M2 | TEMPERATURE: 98 F | SYSTOLIC BLOOD PRESSURE: 128 MMHG | OXYGEN SATURATION: 100 % | HEART RATE: 67 BPM | DIASTOLIC BLOOD PRESSURE: 67 MMHG | RESPIRATION RATE: 18 BRPM

## 2025-01-15 DIAGNOSIS — C50.412 MALIGNANT NEOPLASM OF UPPER-OUTER QUADRANT OF LEFT BREAST IN FEMALE, ESTROGEN RECEPTOR NEGATIVE: Primary | ICD-10-CM

## 2025-01-15 DIAGNOSIS — C78.02 SECONDARY MALIGNANT NEOPLASM OF HILUS OF LEFT LUNG: ICD-10-CM

## 2025-01-15 DIAGNOSIS — C77.1 SECONDARY MALIGNANT NEOPLASM OF MEDIASTINAL LYMPH NODE: ICD-10-CM

## 2025-01-15 DIAGNOSIS — Z17.1 MALIGNANT NEOPLASM OF UPPER-OUTER QUADRANT OF LEFT BREAST IN FEMALE, ESTROGEN RECEPTOR NEGATIVE: Primary | ICD-10-CM

## 2025-01-15 PROCEDURE — 96413 CHEMO IV INFUSION 1 HR: CPT

## 2025-01-15 PROCEDURE — 63600175 PHARM REV CODE 636 W HCPCS: Mod: JZ,TB | Performed by: INTERNAL MEDICINE

## 2025-01-15 RX ORDER — HEPARIN 100 UNIT/ML
500 SYRINGE INTRAVENOUS
Status: DISCONTINUED | OUTPATIENT
Start: 2025-01-15 | End: 2025-01-15 | Stop reason: HOSPADM

## 2025-01-15 RX ADMIN — PACLITAXEL 200 MG: 100 INJECTION, POWDER, LYOPHILIZED, FOR SUSPENSION INTRAVENOUS at 10:01

## 2025-01-15 RX ADMIN — HEPARIN SODIUM (PORCINE) LOCK FLUSH IV SOLN 100 UNIT/ML 500 UNITS: 100 SOLUTION at 11:01

## 2025-01-15 NOTE — PLAN OF CARE
Patient presented to unit for Abraxane infusion (C7D15). VSS. No complaints voiced. Medication infused per orders. Tolerated well, no adverse reaction noted. Port with blood return, flushed with NS, and heparin locked. Next appointments discussed. Patient discharged in [facility].       Problem: Oncology Care  Goal: Effective Coping  Outcome: Progressing  Goal: Improved Activity Tolerance  Outcome: Progressing  Goal: Optimal Oral Intake  Outcome: Progressing  Goal: Improved Oral Mucous Membrane Integrity  Outcome: Progressing  Goal: Optimal Pain Control and Function  Outcome: Progressing

## 2025-01-17 ENCOUNTER — TELEPHONE (OUTPATIENT)
Dept: GASTROENTEROLOGY | Facility: CLINIC | Age: 60
End: 2025-01-17
Payer: COMMERCIAL

## 2025-01-17 NOTE — TELEPHONE ENCOUNTER
----- Message from CHEMA Coulter sent at 1/7/2025  8:07 AM CST -----  2 wk stool kaushik due (Uceris & Canasa daily)

## 2025-01-22 ENCOUNTER — TELEPHONE (OUTPATIENT)
Dept: HEMATOLOGY/ONCOLOGY | Facility: CLINIC | Age: 60
End: 2025-01-22
Payer: COMMERCIAL

## 2025-01-23 ENCOUNTER — OFFICE VISIT (OUTPATIENT)
Dept: HEMATOLOGY/ONCOLOGY | Facility: CLINIC | Age: 60
End: 2025-01-23
Payer: COMMERCIAL

## 2025-01-23 DIAGNOSIS — C50.412 MALIGNANT NEOPLASM OF UPPER-OUTER QUADRANT OF LEFT BREAST IN FEMALE, ESTROGEN RECEPTOR NEGATIVE: Primary | ICD-10-CM

## 2025-01-23 DIAGNOSIS — C77.1 SECONDARY MALIGNANT NEOPLASM OF MEDIASTINAL LYMPH NODE: ICD-10-CM

## 2025-01-23 DIAGNOSIS — D84.821 IMMUNODEFICIENCY DUE TO DRUGS: ICD-10-CM

## 2025-01-23 DIAGNOSIS — Z79.899 IMMUNODEFICIENCY DUE TO DRUGS: ICD-10-CM

## 2025-01-23 DIAGNOSIS — C79.51 CARCINOMA OF BREAST METASTATIC TO BONE, UNSPECIFIED LATERALITY: ICD-10-CM

## 2025-01-23 DIAGNOSIS — Z17.1 MALIGNANT NEOPLASM OF UPPER-OUTER QUADRANT OF LEFT BREAST IN FEMALE, ESTROGEN RECEPTOR NEGATIVE: Primary | ICD-10-CM

## 2025-01-23 DIAGNOSIS — C78.02 SECONDARY MALIGNANT NEOPLASM OF HILUS OF LEFT LUNG: ICD-10-CM

## 2025-01-23 DIAGNOSIS — C50.919 CARCINOMA OF BREAST METASTATIC TO BONE, UNSPECIFIED LATERALITY: ICD-10-CM

## 2025-01-23 NOTE — PROGRESS NOTES
[facility]/ [facility]   at [facility] Note:    The patient location is: [place]  The chief complaint leading to consultation is: breast cancer    Visit type: audiovisual    Each patient to whom he or she provides medical services by telemedicine is:  (1) informed of the relationship between the physician and patient and the respective role of any other health care provider with respect to management of the patient; and (2) notified that he or she may decline to receive medical services by telemedicine and may withdraw from such care at any time.    Notes:     Chief Complaint:   Encounter Diagnoses   Name Primary?    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 female who presents today for follow up for stage IV TNBC. She presents today for a follow up prior to C8D1  Her PET got rescheduled due to the snow and is now scheduled for after cycle #8  She will be moving to [place], [place] and already has an oncologist appt established (Dr Tyler at [facility])  She has thus far been doing well on abraxane. Has mild hair loss  She has no new complaints today. Colitis is much better controlled with improved stool and decreased blood      Oncology History   Patient with TNBC diagnosed in 2022 in [place], [place]   Routine screening ultrasound demonstrated 11mm mass in the L breast  "  Biopsy 9/30/22: IDC, grade 3; ER-/OH-/HER2 0; PDL1 CPS <10    NACT with weekly carboplatin/paclitaxel x 12  Bilateral mastectomy March 2022: residual IDC, 1.7mm; 0 LN+  Adjuvant AC x 4 completed 5/23/23    Routine PET imaging without new disease  Follow up June 2024 demonstrated "elevated tumor markers" with high  and LDH  PET 6/11/24: bilateral hilar and mediastinal LAD, 2.7cm KRIS mass  Bronchoscopy 6/18/24: metastatic carcinoma 4R/11L c/w breast carcinoma     Started Keytruda/Abraxane 6/20/24, dropped Keytruda given no PDL1  Repeat 22C3 showed CPS > 10   October imaging stable     Of note: diagnosed UC 2019 in [place]     Patient Active Problem List   Diagnosis    Malignant neoplasm of upper-outer quadrant of left breast in female, estrogen receptor negative    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    Secondary malignant neoplasm of mediastinal lymph node    Ulcerative rectosigmoiditis with rectal bleeding    Chronic constipation    Immunodeficiency due to drugs       Current Outpatient Medications   Medication Instructions    budesonide (UCERIS) 2 mg, Rectal, 2 times daily, 1 pump twice daily for 4 weeks    calcium carbonate (CALCIUM 500 ORAL) Daily    cholecalciferol (vitamin D3) (VITAMIN D3) 5,000 Units, Daily    ezetimibe (ZETIA) 10 mg, Daily    hepatitis B vacc-CpG 1018, PF, 20 mcg/0.5 mL Syrg inject 0.5ml into the right arm    hydroCHLOROthiazide (HYDRODIURIL) 25 mg, Daily    levothyroxine (SYNTHROID) 112 MCG tablet 1 tablet, Daily    meloxicam (MOBIC) 15 mg, Daily    mesalamine (CANASA) 1,000 mg, Rectal, 2 times daily    mesalamine (LIALDA) 4.8 g, Oral, Daily    omega-3 fatty acids/fish oil (FISH OIL-OMEGA-3 FATTY ACIDS) 300-1,000 mg capsule 2 capsules, Daily    potassium chloride SA (K-DUR,KLOR-CON M) 10 MEQ tablet 20 mEq, Oral, Daily    prochlorperazine (COMPAZINE) 10 mg, Every 12 hours PRN    venlafaxine (EFFEXOR-XR) 225 mg, Daily    zoledronic acid (ZOMETA IV) Every 28 days    " Intact ZyPREXA 10 mg, Daily       Review of Systems:   Review of Systems   Constitutional:  Positive for malaise/fatigue.        Mild fatigue, able to do adls   HENT: Negative.     Respiratory: Negative.  Negative for cough and shortness of breath.    Cardiovascular: Negative.  Negative for chest pain.   Gastrointestinal:  Negative for abdominal pain, constipation, diarrhea and nausea.   Genitourinary:  Negative for frequency.   Musculoskeletal: Negative.  Negative for back pain.   Skin:  Negative for rash.   Neurological: Negative.  Negative for headaches.   Psychiatric/Behavioral:  The patient is not nervous/anxious.    All other systems reviewed and are negative.    Physical Exam  Telemedicine       Assessment & Plan:  1. Malignant neoplasm of upper-outer quadrant of left breast in female, estrogen receptor negative    2. 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 Secondary malignant neoplasm of mediastinal lymph node    4. Carcinoma of breast metastatic to bone, unspecified laterality    5. Immunodeficiency due to drugs      Patient with stage IV TNBC on abraxane  Previously receiving Keytruda/Abraxane but review of records show PDL1 CPS <10 on lung assay  While pending 22C3 testing, continued weekly abraxane 3 on/ 1 off. Repeat 22C3 testing shows CPS >10.   Repeat imaging October 2024 shows stable size and improved SUV in index bone lesions. Artifact on imaging obscures lung nodule. Repeat PET scheduled for January cancelled due to snow storm. Rescheduled for after cycle 8  Discussed that we will proceed with cycle 8 of Abraxane only with plan to recommend Keytruda addition if POD on imaging.     Ulcerative proctitis, which precedes cancer diagnosis, is improving. Seen by Dr Sumner with recommendations    Zometa every 4 weeks - last dose 1/2/25    Return to clinic in 2 weeks with MD appointment and labs.     Route Chart for Scheduling    Med Onc Chart Routing      Follow up with physician    Follow up with  LINDA    Infusion scheduling note   needs C8D8 on 2/5 and C8D15 on 2/12   Injection scheduling note    Labs CBC and CMP   Scheduling:  Preferred lab:  Lab interval:  labs on 1/28, 2/4, and 2/11   Imaging   Already scheduled   Pharmacy appointment    Other referrals                  Treatment Plan Information   OP BREAST NAB-PACLITAXEL WEEKLY Renetta Anderson MD   Associated diagnosis: Malignant neoplasm of upper-outer quadrant of left breast in female, estrogen receptor negative Stage IV rcTX, cNX, pM1, G3, ER-, HI-, HER2- noted on 7/16/2024  Associated diagnosis: 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    Supportive Plan Information  OP ZOLEDRONIC ACID (ZOMETA) Q4W Renetta Anderosn MD   Associated Diagnosis: Malignant neoplasm of upper-outer quadrant of left breast in female, estrogen receptor negative Stage IV rcTX, cNX, pM1, G3, ER-, HI-, HER2- noted on 7/16/2024   Line of treatment: Supportive Care   Treatment goal: Palliative     Upcoming Treatment Dates - OP ZOLEDRONIC ACID (ZOMETA) Q4W    1/29/2025       Medications       zoledronic 4 mg/100 mL infusion 4 mg  2/26/2025       Medications       zoledronic 4 mg/100 mL infusion 4 mg  3/26/2025       Medications       zoledronic 4 mg/100 mL infusion 4 mg  4/23/2025       Medications       zoledronic 4 mg/100 mL infusion 4 mg    MDM includes  :    - Acute or chronic illness or injury that poses a threat to life or bodily function  - Independent review and explanation of 3+ results from unique tests  - Discussion of management and ordering 3+ unique tests  - Extensive discussion of treatment and management  - Prescription drug management  - Drug therapy requiring intensive  monitoring for toxicity    Renetta Anderson MD   01/23/2025    \

## 2025-01-23 NOTE — Clinical Note
Hey, she's moving to [place] at the end of February. Can we get records sent to Dr Wanda Tyler at: [email or web address]

## 2025-01-24 ENCOUNTER — LAB VISIT (OUTPATIENT)
Dept: LAB | Facility: HOSPITAL | Age: 60
End: 2025-01-24
Attending: INTERNAL MEDICINE
Payer: COMMERCIAL

## 2025-01-24 DIAGNOSIS — D84.821 IMMUNODEFICIENCY DUE TO DRUGS: ICD-10-CM

## 2025-01-24 DIAGNOSIS — Z79.899 IMMUNODEFICIENCY DUE TO DRUGS: ICD-10-CM

## 2025-01-24 DIAGNOSIS — K51.311 ULCERATIVE RECTOSIGMOIDITIS WITH RECTAL BLEEDING: ICD-10-CM

## 2025-01-24 PROCEDURE — 83993 ASSAY FOR CALPROTECTIN FECAL: CPT | Performed by: INTERNAL MEDICINE

## 2025-01-27 RX ORDER — PROCHLORPERAZINE EDISYLATE 5 MG/ML
10 INJECTION INTRAMUSCULAR; INTRAVENOUS ONCE AS NEEDED
Start: 2025-02-11

## 2025-01-27 RX ORDER — SODIUM CHLORIDE 0.9 % (FLUSH) 0.9 %
10 SYRINGE (ML) INJECTION
OUTPATIENT
Start: 2025-01-29

## 2025-01-27 RX ORDER — ZOLEDRONIC ACID 0.04 MG/ML
4 INJECTION, SOLUTION INTRAVENOUS
OUTPATIENT
Start: 2025-01-29

## 2025-01-27 RX ORDER — HEPARIN 100 UNIT/ML
500 SYRINGE INTRAVENOUS
Status: CANCELLED | OUTPATIENT
Start: 2025-01-29

## 2025-01-27 RX ORDER — EPINEPHRINE 0.3 MG/.3ML
0.3 INJECTION SUBCUTANEOUS ONCE AS NEEDED
OUTPATIENT
Start: 2025-02-04

## 2025-01-27 RX ORDER — EPINEPHRINE 0.3 MG/.3ML
0.3 INJECTION SUBCUTANEOUS ONCE AS NEEDED
Status: CANCELLED | OUTPATIENT
Start: 2025-01-29

## 2025-01-27 RX ORDER — PROCHLORPERAZINE EDISYLATE 5 MG/ML
10 INJECTION INTRAMUSCULAR; INTRAVENOUS ONCE AS NEEDED
Status: CANCELLED
Start: 2025-01-29

## 2025-01-27 RX ORDER — SODIUM CHLORIDE 0.9 % (FLUSH) 0.9 %
10 SYRINGE (ML) INJECTION
OUTPATIENT
Start: 2025-02-11

## 2025-01-27 RX ORDER — DIPHENHYDRAMINE HYDROCHLORIDE 50 MG/ML
50 INJECTION INTRAMUSCULAR; INTRAVENOUS ONCE AS NEEDED
OUTPATIENT
Start: 2025-02-11

## 2025-01-27 RX ORDER — DIPHENHYDRAMINE HYDROCHLORIDE 50 MG/ML
50 INJECTION INTRAMUSCULAR; INTRAVENOUS ONCE AS NEEDED
OUTPATIENT
Start: 2025-02-04

## 2025-01-27 RX ORDER — EPINEPHRINE 0.3 MG/.3ML
0.3 INJECTION SUBCUTANEOUS ONCE AS NEEDED
OUTPATIENT
Start: 2025-02-11

## 2025-01-27 RX ORDER — HEPARIN 100 UNIT/ML
500 SYRINGE INTRAVENOUS
OUTPATIENT
Start: 2025-01-29

## 2025-01-27 RX ORDER — PROCHLORPERAZINE EDISYLATE 5 MG/ML
10 INJECTION INTRAMUSCULAR; INTRAVENOUS ONCE AS NEEDED
Start: 2025-02-04

## 2025-01-27 RX ORDER — SODIUM CHLORIDE 0.9 % (FLUSH) 0.9 %
10 SYRINGE (ML) INJECTION
Status: CANCELLED | OUTPATIENT
Start: 2025-01-29

## 2025-01-27 RX ORDER — HEPARIN 100 UNIT/ML
500 SYRINGE INTRAVENOUS
OUTPATIENT
Start: 2025-02-04

## 2025-01-27 RX ORDER — DIPHENHYDRAMINE HYDROCHLORIDE 50 MG/ML
50 INJECTION INTRAMUSCULAR; INTRAVENOUS ONCE AS NEEDED
Status: CANCELLED | OUTPATIENT
Start: 2025-01-29

## 2025-01-27 RX ORDER — HEPARIN 100 UNIT/ML
500 SYRINGE INTRAVENOUS
OUTPATIENT
Start: 2025-02-11

## 2025-01-27 RX ORDER — SODIUM CHLORIDE 0.9 % (FLUSH) 0.9 %
10 SYRINGE (ML) INJECTION
OUTPATIENT
Start: 2025-02-04

## 2025-01-29 ENCOUNTER — INFUSION (OUTPATIENT)
Dept: INFUSION THERAPY | Facility: HOSPITAL | Age: 60
End: 2025-01-29
Attending: INTERNAL MEDICINE
Payer: COMMERCIAL

## 2025-01-29 VITALS
WEIGHT: 199.75 LBS | SYSTOLIC BLOOD PRESSURE: 124 MMHG | BODY MASS INDEX: 32.24 KG/M2 | DIASTOLIC BLOOD PRESSURE: 64 MMHG | HEART RATE: 73 BPM | OXYGEN SATURATION: 98 % | TEMPERATURE: 98 F | RESPIRATION RATE: 16 BRPM

## 2025-01-29 DIAGNOSIS — C50.412 MALIGNANT NEOPLASM OF UPPER-OUTER QUADRANT OF LEFT BREAST IN FEMALE, ESTROGEN RECEPTOR NEGATIVE: Primary | ICD-10-CM

## 2025-01-29 DIAGNOSIS — Z17.1 MALIGNANT NEOPLASM OF UPPER-OUTER QUADRANT OF LEFT BREAST IN FEMALE, ESTROGEN RECEPTOR NEGATIVE: Primary | ICD-10-CM

## 2025-01-29 DIAGNOSIS — C78.02 SECONDARY MALIGNANT NEOPLASM OF HILUS OF LEFT LUNG: ICD-10-CM

## 2025-01-29 DIAGNOSIS — C77.1 SECONDARY MALIGNANT NEOPLASM OF MEDIASTINAL LYMPH NODE: ICD-10-CM

## 2025-01-29 LAB — CALPROTECTIN STL-MCNT: 867 MCG/G

## 2025-01-29 PROCEDURE — 63600175 PHARM REV CODE 636 W HCPCS: Mod: JZ,TB | Performed by: INTERNAL MEDICINE

## 2025-01-29 PROCEDURE — 96413 CHEMO IV INFUSION 1 HR: CPT

## 2025-01-29 RX ORDER — HEPARIN 100 UNIT/ML
500 SYRINGE INTRAVENOUS
Status: DISCONTINUED | OUTPATIENT
Start: 2025-01-29 | End: 2025-01-29 | Stop reason: HOSPADM

## 2025-01-29 RX ADMIN — HEPARIN 500 UNITS: 100 SYRINGE at 11:01

## 2025-01-29 RX ADMIN — PACLITAXEL 200 MG: 100 INJECTION, POWDER, LYOPHILIZED, FOR SUSPENSION INTRAVENOUS at 10:01

## 2025-01-29 NOTE — PLAN OF CARE
Pt tolerated Abraxane infusion with no complaints or S&S of reaction and discharged home upon completion in [facility].